# Patient Record
Sex: FEMALE | Race: WHITE | Employment: FULL TIME | URBAN - METROPOLITAN AREA
[De-identification: names, ages, dates, MRNs, and addresses within clinical notes are randomized per-mention and may not be internally consistent; named-entity substitution may affect disease eponyms.]

---

## 2019-04-03 ENCOUNTER — HOSPITAL ENCOUNTER (OUTPATIENT)
Age: 59
Discharge: HOME | End: 2019-04-05

## 2019-04-04 PROCEDURE — 88305 TISSUE EXAM BY PATHOLOGIST: CPT

## 2020-06-30 ENCOUNTER — HOSPITAL ENCOUNTER (EMERGENCY)
Age: 60
Discharge: HOME | End: 2020-06-30
Attending: EMERGENCY MEDICINE
Payer: COMMERCIAL

## 2020-06-30 VITALS
DIASTOLIC BLOOD PRESSURE: 84 MMHG | OXYGEN SATURATION: 96 % | SYSTOLIC BLOOD PRESSURE: 144 MMHG | BODY MASS INDEX: 23.62 KG/M2 | HEIGHT: 70 IN | HEART RATE: 67 BPM | TEMPERATURE: 97.5 F | RESPIRATION RATE: 16 BRPM | WEIGHT: 165 LBS

## 2020-06-30 PROCEDURE — 6370000000 HC RX 637 (ALT 250 FOR IP): Performed by: STUDENT IN AN ORGANIZED HEALTH CARE EDUCATION/TRAINING PROGRAM

## 2020-06-30 PROCEDURE — 99282 EMERGENCY DEPT VISIT SF MDM: CPT

## 2020-06-30 RX ORDER — HYDROXYZINE HYDROCHLORIDE 25 MG/1
25 TABLET, FILM COATED ORAL EVERY 8 HOURS PRN
Qty: 30 TABLET | Refills: 0 | Status: SHIPPED | OUTPATIENT
Start: 2020-06-30 | End: 2020-07-10

## 2020-06-30 RX ORDER — PREDNISONE 20 MG/1
20 TABLET ORAL 2 TIMES DAILY
Qty: 28 TABLET | Refills: 0 | Status: SHIPPED | OUTPATIENT
Start: 2020-06-30 | End: 2020-07-14

## 2020-06-30 RX ORDER — HYDROXYZINE PAMOATE 25 MG/1
25 CAPSULE ORAL ONCE
Status: COMPLETED | OUTPATIENT
Start: 2020-06-30 | End: 2020-06-30

## 2020-06-30 RX ORDER — PREDNISONE 20 MG/1
60 TABLET ORAL ONCE
Status: COMPLETED | OUTPATIENT
Start: 2020-06-30 | End: 2020-06-30

## 2020-06-30 RX ORDER — DIPHENHYDRAMINE HCL 25 MG
25 TABLET ORAL ONCE
Status: COMPLETED | OUTPATIENT
Start: 2020-06-30 | End: 2020-06-30

## 2020-06-30 RX ADMIN — HYDROXYZINE PAMOATE 25 MG: 25 CAPSULE ORAL at 08:59

## 2020-06-30 RX ADMIN — DIPHENHYDRAMINE HYDROCHLORIDE 25 MG: 25 TABLET ORAL at 08:59

## 2020-06-30 RX ADMIN — PREDNISONE 60 MG: 20 TABLET ORAL at 08:59

## 2020-06-30 ASSESSMENT — ENCOUNTER SYMPTOMS
SORE THROAT: 0
SHORTNESS OF BREATH: 0
BACK PAIN: 0
DIARRHEA: 0
WHEEZING: 0
NAUSEA: 0
ABDOMINAL DISTENTION: 0
COUGH: 0
EYE DISCHARGE: 0
VOMITING: 0
EYE PAIN: 0
EYE REDNESS: 0
SINUS PRESSURE: 0

## 2020-06-30 NOTE — ED NOTES
Pt alert and oriented x4. Speech clear. Respirations easy/unlabored. Skin warm/dry. Appropriate color. No signs of acute distress noted. Pt/family teaching provided; verbalized understanding. Pt stable for discharge.        Bina King RN  06/30/20 2823

## 2020-06-30 NOTE — ED PROVIDER NOTES
Normal range of motion and neck supple. Cardiovascular:      Rate and Rhythm: Normal rate and regular rhythm. Heart sounds: Normal heart sounds. No murmur. Pulmonary:      Effort: Pulmonary effort is normal. No respiratory distress. Breath sounds: Normal breath sounds. No wheezing or rales. Abdominal:      General: Bowel sounds are normal.      Palpations: Abdomen is soft. Tenderness: There is no abdominal tenderness. There is no guarding or rebound. Skin:     General: Skin is warm and dry. Comments: Right hernandez-orbital swelling involving the eyebrow, eyelid, and cheek. Diffuse rash noted to the forearms of both UE's. Rash to anterior chest and left side of neck. Neurological:      Mental Status: She is alert and oriented to person, place, and time. Cranial Nerves: No cranial nerve deficit. Coordination: Coordination normal.          Procedures     MDM  Number of Diagnoses or Management Options  Diagnosis management comments: Patient is a 81-year-old female showing signs of contact dermatitis to be treated with prednisone, Atarax and Benadryl and asked to follow-up with her primary physician. Patient denied any signs of respiratory distress or swelling to the throat. Patient was counseled on return precautions and was agreeable to the plan for discharge. ED Course as of Jun 30 0950 Tue Jun 30, 2020   7670 Patient to be given prednisone and atarax for continued treatment of her symptoms. She will be asked to follow up with her PCP. [AL]      ED Course User Index  [AL] Stefan Velez DO        ED Course as of Jun 30 0950 Tue Jun 30, 2020   4076 Patient to be given prednisone and atarax for continued treatment of her symptoms. She will be asked to follow up with her PCP.      [AL]      ED Course User Index  [AL] Stefan Velez DO       --------------------------------------------- PAST HISTORY ---------------------------------------------  Past Medical History:  has no with outpatient follow-up. The plan has been discussed in detail and they are aware of the specific conditions for emergent return, as well as the importance of follow-up. New Prescriptions    HYDROXYZINE (ATARAX) 25 MG TABLET    Take 1 tablet by mouth every 8 hours as needed for Itching    PREDNISONE (DELTASONE) 20 MG TABLET    Take 1 tablet by mouth 2 times daily for 14 days       Diagnosis:  1. Contact dermatitis, unspecified contact dermatitis type, unspecified trigger        Disposition:  Patient's disposition: Discharge to home  Patient's condition is stable.            Wilma Art,   Resident  06/30/20 8287

## 2020-12-23 ENCOUNTER — HOSPITAL ENCOUNTER (EMERGENCY)
Age: 60
Discharge: HOME | End: 2020-12-23
Attending: EMERGENCY MEDICINE
Payer: COMMERCIAL

## 2020-12-23 ENCOUNTER — APPOINTMENT (OUTPATIENT)
Dept: ULTRASOUND IMAGING | Age: 60
End: 2020-12-23
Payer: COMMERCIAL

## 2020-12-23 VITALS
WEIGHT: 171 LBS | TEMPERATURE: 98 F | SYSTOLIC BLOOD PRESSURE: 145 MMHG | OXYGEN SATURATION: 99 % | RESPIRATION RATE: 16 BRPM | HEART RATE: 66 BPM | BODY MASS INDEX: 24.48 KG/M2 | HEIGHT: 70 IN | DIASTOLIC BLOOD PRESSURE: 77 MMHG

## 2020-12-23 LAB
ALBUMIN SERPL-MCNC: 4.5 G/DL (ref 3.5–5.2)
ALP BLD-CCNC: 80 U/L (ref 35–104)
ALT SERPL-CCNC: 14 U/L (ref 0–32)
ANION GAP SERPL CALCULATED.3IONS-SCNC: 8 MMOL/L (ref 7–16)
AST SERPL-CCNC: 18 U/L (ref 0–31)
BASOPHILS ABSOLUTE: 0.04 E9/L (ref 0–0.2)
BASOPHILS RELATIVE PERCENT: 0.9 % (ref 0–2)
BILIRUB SERPL-MCNC: 0.4 MG/DL (ref 0–1.2)
BUN BLDV-MCNC: 10 MG/DL (ref 6–20)
CALCIUM SERPL-MCNC: 9.3 MG/DL (ref 8.6–10.2)
CHLORIDE BLD-SCNC: 106 MMOL/L (ref 98–107)
CO2: 26 MMOL/L (ref 22–29)
CREAT SERPL-MCNC: 0.8 MG/DL (ref 0.5–1)
EOSINOPHILS ABSOLUTE: 0.09 E9/L (ref 0.05–0.5)
EOSINOPHILS RELATIVE PERCENT: 1.9 % (ref 0–6)
GFR AFRICAN AMERICAN: >60
GFR NON-AFRICAN AMERICAN: >60 ML/MIN/1.73
GLUCOSE BLD-MCNC: 96 MG/DL (ref 74–99)
HCT VFR BLD CALC: 40 % (ref 34–48)
HEMOGLOBIN: 13.9 G/DL (ref 11.5–15.5)
IMMATURE GRANULOCYTES #: 0.01 E9/L
IMMATURE GRANULOCYTES %: 0.2 % (ref 0–5)
LIPASE: 57 U/L (ref 13–60)
LYMPHOCYTES ABSOLUTE: 1.55 E9/L (ref 1.5–4)
LYMPHOCYTES RELATIVE PERCENT: 33.5 % (ref 20–42)
MCH RBC QN AUTO: 29.4 PG (ref 26–35)
MCHC RBC AUTO-ENTMCNC: 34.8 % (ref 32–34.5)
MCV RBC AUTO: 84.6 FL (ref 80–99.9)
MONOCYTES ABSOLUTE: 0.42 E9/L (ref 0.1–0.95)
MONOCYTES RELATIVE PERCENT: 9.1 % (ref 2–12)
NEUTROPHILS ABSOLUTE: 2.52 E9/L (ref 1.8–7.3)
NEUTROPHILS RELATIVE PERCENT: 54.4 % (ref 43–80)
PDW BLD-RTO: 12.3 FL (ref 11.5–15)
PLATELET # BLD: 278 E9/L (ref 130–450)
PMV BLD AUTO: 9 FL (ref 7–12)
POTASSIUM SERPL-SCNC: 4 MMOL/L (ref 3.5–5)
RBC # BLD: 4.73 E12/L (ref 3.5–5.5)
SODIUM BLD-SCNC: 140 MMOL/L (ref 132–146)
TOTAL PROTEIN: 6.7 G/DL (ref 6.4–8.3)
TROPONIN: <0.01 NG/ML (ref 0–0.03)
WBC # BLD: 4.6 E9/L (ref 4.5–11.5)

## 2020-12-23 PROCEDURE — 83690 ASSAY OF LIPASE: CPT

## 2020-12-23 PROCEDURE — 99285 EMERGENCY DEPT VISIT HI MDM: CPT

## 2020-12-23 PROCEDURE — 76705 ECHO EXAM OF ABDOMEN: CPT

## 2020-12-23 PROCEDURE — 93005 ELECTROCARDIOGRAM TRACING: CPT | Performed by: EMERGENCY MEDICINE

## 2020-12-23 PROCEDURE — 84484 ASSAY OF TROPONIN QUANT: CPT

## 2020-12-23 PROCEDURE — 80053 COMPREHEN METABOLIC PANEL: CPT

## 2020-12-23 PROCEDURE — 85025 COMPLETE CBC W/AUTO DIFF WBC: CPT

## 2020-12-23 RX ORDER — HYDROCODONE BITARTRATE AND ACETAMINOPHEN 5; 325 MG/1; MG/1
1 TABLET ORAL EVERY 4 HOURS PRN
Qty: 18 TABLET | Refills: 0 | Status: SHIPPED | OUTPATIENT
Start: 2020-12-23 | End: 2020-12-26

## 2020-12-23 RX ORDER — LEVOTHYROXINE SODIUM 0.03 MG/1
100 TABLET ORAL EVERY MORNING
Status: ON HOLD | COMMUNITY
End: 2022-06-28 | Stop reason: HOSPADM

## 2020-12-23 RX ORDER — ONDANSETRON 4 MG/1
4 TABLET, ORALLY DISINTEGRATING ORAL EVERY 8 HOURS PRN
Qty: 10 TABLET | Refills: 0 | Status: SHIPPED | OUTPATIENT
Start: 2020-12-23 | End: 2022-06-27 | Stop reason: ALTCHOICE

## 2020-12-23 RX ORDER — DULOXETIN HYDROCHLORIDE 30 MG/1
30 CAPSULE, DELAYED RELEASE ORAL EVERY MORNING
COMMUNITY

## 2020-12-23 ASSESSMENT — PAIN DESCRIPTION - ORIENTATION: ORIENTATION: RIGHT;UPPER

## 2020-12-23 ASSESSMENT — PAIN DESCRIPTION - LOCATION: LOCATION: ABDOMEN

## 2020-12-23 ASSESSMENT — PAIN SCALES - GENERAL: PAINLEVEL_OUTOF10: 3

## 2020-12-23 ASSESSMENT — PAIN DESCRIPTION - PAIN TYPE: TYPE: ACUTE PAIN

## 2020-12-23 ASSESSMENT — PAIN DESCRIPTION - DESCRIPTORS: DESCRIPTORS: STABBING

## 2020-12-23 NOTE — ED NOTES
Bed: HAA  Expected date:   Expected time:   Means of arrival:   Comments:  Triage      Dhara Villa RN  12/23/20 1765

## 2020-12-23 NOTE — ED PROVIDER NOTES
Department of Emergency Medicine   ED  Provider Note  Admit Date/RoomTime: 12/23/2020  1:23 PM  ED Room: [facility]          History of Present Illness:  12/23/20, Time: 5:14 PM EST  Chief Complaint   Patient presents with    Abdominal Pain     had an ultrasound a week ago in [place] which was negative. Is suppose to have a scan done but with the holidays the office is closed. last NPO this AM at 0830. Aman Segura is a 62 y.o. female presenting to the ED for RUQ pain, beginning a few hours. The complaint has been intermittent, moderate in severity, and worsened by eating\. Patient presents for right upper quadrant pain. States she had episode about a week ago, had an ultrasound for better PCPs office was told she did not have gallstones or sludge. She is in the process of being set up for an outpatient HIDA scan. She is been eating low-fat diet, this morning had oatmeal when she had severe right upper quadrant pain. Nausea without vomiting. States this lasted for a few hours and then resolved. States pain better controlled at this time. Review of Systems:   Pertinent positives and negatives are stated within HPI, all other systems reviewed and are negative.        --------------------------------------------- PAST HISTORY ---------------------------------------------  Past Medical History:  has a past medical history of Depression and Thyroid disease. Past Surgical History:  has a past surgical history that includes eye surgery and Tonsillectomy. Social History:  reports that she has never smoked. She has never used smokeless tobacco. She reports current alcohol use. She reports that she does not use drugs. Family History: family history is not on file. . Unless otherwise noted, family history is non contributory    The patients home medications have been reviewed. Allergies: Patient has no known allergies. ---------------------------------------------------PHYSICAL EXAM--------------------------------------    Constitutional/General: Alert and oriented x3  Head: Normocephalic and atraumatic  Eyes: PERRL, EOMI, sclera non icteric  Mouth: Oropharynx clear, handling secretions, no trismus, no asymmetry of the posterior oropharynx or uvular edema  Neck: Supple, full ROM, no stridor, no meningeal signs  Respiratory: Lungs clear to auscultation bilaterally,Not in respiratory distress  Cardiovascular:  Regular rate. Regular rhythm. 2+ distal pulses. Equal extremity pulses. Chest: No chest wall tenderness  GI:  Abdomen Soft, nondistended, minimal tenderness in the right upper quadrant. No flank tenderness. No rebound, guarding, or rigidity. Musculoskeletal: Moves all extremities x 4. Warm and well perfused, no clubbing, cyanosis, or edema. Capillary refill <3 seconds  Integument: skin warm and dry. No rashes. Neurologic: GCS 15, no focal deficits, symmetric strength 5/5 in the upper and lower extremities bilaterally  Psychiatric: Normal Affect       -------------------------------------------------- RESULTS -------------------------------------------------  I have personally reviewed all laboratory and imaging results for this patient. Results are listed below.      LABS: (Lab results interpreted by me)  Results for orders placed or performed during the hospital encounter of 12/23/20   CBC auto differential   Result Value Ref Range    WBC 4.6 4.5 - 11.5 E9/L    RBC 4.73 3.50 - 5.50 E12/L    Hemoglobin 13.9 11.5 - 15.5 g/dL    Hematocrit 40.0 34.0 - 48.0 %    MCV 84.6 80.0 - 99.9 fL    MCH 29.4 26.0 - 35.0 pg    MCHC 34.8 (H) 32.0 - 34.5 %    RDW 12.3 11.5 - 15.0 fL    Platelets 715 177 - 678 E9/L    MPV 9.0 7.0 - 12.0 fL    Neutrophils % 54.4 43.0 - 80.0 %    Immature Granulocytes % 0.2 0.0 - 5.0 %    Lymphocytes % 33.5 20.0 - 42.0 %    Monocytes % 9.1 2.0 - 12.0 %    Eosinophils % 1.9 0.0 - 6.0 % Basophils % 0.9 0.0 - 2.0 %    Neutrophils Absolute 2.52 1.80 - 7.30 E9/L    Immature Granulocytes # 0.01 E9/L    Lymphocytes Absolute 1.55 1.50 - 4.00 E9/L    Monocytes Absolute 0.42 0.10 - 0.95 E9/L    Eosinophils Absolute 0.09 0.05 - 0.50 E9/L    Basophils Absolute 0.04 0.00 - 0.20 E9/L   Comprehensive metabolic panel   Result Value Ref Range    Sodium 140 132 - 146 mmol/L    Potassium 4.0 3.5 - 5.0 mmol/L    Chloride 106 98 - 107 mmol/L    CO2 26 22 - 29 mmol/L    Anion Gap 8 7 - 16 mmol/L    Glucose 96 74 - 99 mg/dL    BUN 10 6 - 20 mg/dL    CREATININE 0.8 0.5 - 1.0 mg/dL    GFR Non-African American >60 >=60 mL/min/1.73    GFR African American >60     Calcium 9.3 8.6 - 10.2 mg/dL    Total Protein 6.7 6.4 - 8.3 g/dL    Alb 4.5 3.5 - 5.2 g/dL    Total Bilirubin 0.4 0.0 - 1.2 mg/dL    Alkaline Phosphatase 80 35 - 104 U/L    ALT 14 0 - 32 U/L    AST 18 0 - 31 U/L   Lipase   Result Value Ref Range    Lipase 57 13 - 60 U/L   Troponin   Result Value Ref Range    Troponin <0.01 0.00 - 0.03 ng/mL   EKG 12 Lead   Result Value Ref Range    Ventricular Rate 57 BPM    Atrial Rate 57 BPM    P-R Interval 182 ms    QRS Duration 86 ms    Q-T Interval 430 ms    QTc Calculation (Bazett) 418 ms    P Axis 69 degrees    R Axis -21 degrees    T Axis 59 degrees   ,       RADIOLOGY:  Interpreted by Radiologist unless otherwise specified  US GALLBLADDER RUQ   Final Result   1. No cholelithiasis or evidence of gallbladder inflammation.    2.  Comet tail artifact originating within the wall of the gallbladder fundus   is suggestive of underlying adenomyomatosis.                       ------------------------- NURSING NOTES AND VITALS REVIEWED ---------------------------   The nursing notes within the ED encounter and vital signs as below have been reviewed by myself  BP (!) 145/77   Pulse 66   Temp 98 °F (36.7 °C) (Oral)   Resp 16   Ht 5' 10\" (1.778 m)   Wt 171 lb (77.6 kg)   SpO2 99%   BMI 24.54 kg/m² Oxygen Saturation Interpretation: Normal         The patients available past medical records and past encounters were reviewed. ------------------------------ ED COURSE/MEDICAL DECISION MAKING----------------------  Medications - No data to display                 Medical Decision Making:     I, Dr. Paige Antoine am the primary provider of record    Pain well controlled. Favor biliary colic. She is referred to general surgery secondary to her ultrasound. Discussed the importance of adhering to low-fat diet. She states understanding agreement. Re-Evaluations:        - Pain better controlled, pt states they feel \"better\", tolerating PO well         This patient's ED course included: a personal history and physicial examination, re-evaluation prior to disposition and complex medical decision making and emergency management    This patient has remained hemodynamically stable during their ED course. Counseling: The emergency provider has spoken with the patient and discussed todays results, in addition to providing specific details for the plan of care and counseling regarding the diagnosis and prognosis. Questions are answered at this time and they are agreeable with the plan.       --------------------------------- IMPRESSION AND DISPOSITION ---------------------------------    IMPRESSION  1. Abdominal pain, right upper quadrant    2. Biliary colic        DISPOSITION  Disposition: Discharge to home  Patient condition is stable        NOTE: This report was transcribed using voice recognition software.  Every effort was made to ensure accuracy; however, inadvertent computerized transcription errors may be present       Sabine Hampton DO  12/23/20 7165

## 2020-12-25 LAB
EKG ATRIAL RATE: 57 BPM
EKG P AXIS: 69 DEGREES
EKG P-R INTERVAL: 182 MS
EKG Q-T INTERVAL: 430 MS
EKG QRS DURATION: 86 MS
EKG QTC CALCULATION (BAZETT): 418 MS
EKG R AXIS: -21 DEGREES
EKG T AXIS: 59 DEGREES
EKG VENTRICULAR RATE: 57 BPM

## 2020-12-25 PROCEDURE — 93010 ELECTROCARDIOGRAM REPORT: CPT | Performed by: INTERNAL MEDICINE

## 2021-01-14 ENCOUNTER — HOSPITAL ENCOUNTER (OUTPATIENT)
Age: 61
Discharge: HOME | End: 2021-01-16

## 2021-01-14 PROCEDURE — 88305 TISSUE EXAM BY PATHOLOGIST: CPT

## 2021-01-14 PROCEDURE — 88342 IMHCHEM/IMCYTCHM 1ST ANTB: CPT

## 2022-05-27 ENCOUNTER — HOSPITAL ENCOUNTER (OUTPATIENT)
Age: 62
Setting detail: OBSERVATION
Discharge: HOME | End: 2022-05-28
Attending: EMERGENCY MEDICINE | Admitting: ORTHOPAEDIC SURGERY
Payer: COMMERCIAL

## 2022-05-27 DIAGNOSIS — S82.892A CLOSED FRACTURE OF LEFT ANKLE, INITIAL ENCOUNTER: Primary | ICD-10-CM

## 2022-05-27 PROCEDURE — 99285 EMERGENCY DEPT VISIT HI MDM: CPT

## 2022-05-27 ASSESSMENT — PAIN DESCRIPTION - FREQUENCY: FREQUENCY: CONTINUOUS

## 2022-05-27 ASSESSMENT — PAIN DESCRIPTION - DESCRIPTORS: DESCRIPTORS: OTHER (COMMENT)

## 2022-05-27 ASSESSMENT — LIFESTYLE VARIABLES: HOW OFTEN DO YOU HAVE A DRINK CONTAINING ALCOHOL: NEVER

## 2022-05-27 ASSESSMENT — PAIN DESCRIPTION - LOCATION: LOCATION: ANKLE

## 2022-05-27 ASSESSMENT — PAIN SCALES - GENERAL: PAINLEVEL_OUTOF10: 8

## 2022-05-27 ASSESSMENT — PAIN DESCRIPTION - ORIENTATION: ORIENTATION: LEFT

## 2022-05-27 ASSESSMENT — PAIN - FUNCTIONAL ASSESSMENT: PAIN_FUNCTIONAL_ASSESSMENT: 0-10

## 2022-05-28 ENCOUNTER — APPOINTMENT (OUTPATIENT)
Dept: GENERAL RADIOLOGY | Age: 62
End: 2022-05-28
Payer: COMMERCIAL

## 2022-05-28 ENCOUNTER — ANESTHESIA EVENT (OUTPATIENT)
Dept: OPERATING ROOM | Age: 62
End: 2022-05-28
Payer: COMMERCIAL

## 2022-05-28 ENCOUNTER — ANESTHESIA (OUTPATIENT)
Dept: OPERATING ROOM | Age: 62
End: 2022-05-28
Payer: COMMERCIAL

## 2022-05-28 VITALS
BODY MASS INDEX: 25.92 KG/M2 | HEART RATE: 67 BPM | RESPIRATION RATE: 15 BRPM | HEIGHT: 69 IN | SYSTOLIC BLOOD PRESSURE: 133 MMHG | WEIGHT: 175 LBS | DIASTOLIC BLOOD PRESSURE: 90 MMHG | TEMPERATURE: 97.3 F | OXYGEN SATURATION: 94 %

## 2022-05-28 PROBLEM — S82.822A CLOSED TORUS FRACTURE OF LOWER END OF LEFT FIBULA: Status: ACTIVE | Noted: 2022-05-28

## 2022-05-28 PROCEDURE — 6360000002 HC RX W HCPCS: Performed by: ANESTHESIOLOGY

## 2022-05-28 PROCEDURE — 7100000000 HC PACU RECOVERY - FIRST 15 MIN: Performed by: ORTHOPAEDIC SURGERY

## 2022-05-28 PROCEDURE — 2500000003 HC RX 250 WO HCPCS: Performed by: NURSE ANESTHETIST, CERTIFIED REGISTERED

## 2022-05-28 PROCEDURE — 27792 TREATMENT OF ANKLE FRACTURE: CPT | Performed by: ORTHOPAEDIC SURGERY

## 2022-05-28 PROCEDURE — 6370000000 HC RX 637 (ALT 250 FOR IP): Performed by: EMERGENCY MEDICINE

## 2022-05-28 PROCEDURE — 73610 X-RAY EXAM OF ANKLE: CPT

## 2022-05-28 PROCEDURE — C1713 ANCHOR/SCREW BN/BN,TIS/BN: HCPCS | Performed by: ORTHOPAEDIC SURGERY

## 2022-05-28 PROCEDURE — 3600000005 HC SURGERY LEVEL 5 BASE: Performed by: ORTHOPAEDIC SURGERY

## 2022-05-28 PROCEDURE — 29515 APPLICATION SHORT LEG SPLINT: CPT

## 2022-05-28 PROCEDURE — 77071 MNL APPL STRS JT RADIOGRAPHY: CPT | Performed by: ORTHOPAEDIC SURGERY

## 2022-05-28 PROCEDURE — 2709999900 HC NON-CHARGEABLE SUPPLY: Performed by: ORTHOPAEDIC SURGERY

## 2022-05-28 PROCEDURE — 2580000003 HC RX 258: Performed by: NURSE ANESTHETIST, CERTIFIED REGISTERED

## 2022-05-28 PROCEDURE — 3700000001 HC ADD 15 MINUTES (ANESTHESIA): Performed by: ORTHOPAEDIC SURGERY

## 2022-05-28 PROCEDURE — 6360000002 HC RX W HCPCS: Performed by: NURSE ANESTHETIST, CERTIFIED REGISTERED

## 2022-05-28 PROCEDURE — 73590 X-RAY EXAM OF LOWER LEG: CPT

## 2022-05-28 PROCEDURE — 3600000015 HC SURGERY LEVEL 5 ADDTL 15MIN: Performed by: ORTHOPAEDIC SURGERY

## 2022-05-28 PROCEDURE — 2720000010 HC SURG SUPPLY STERILE: Performed by: ORTHOPAEDIC SURGERY

## 2022-05-28 PROCEDURE — 7100000010 HC PHASE II RECOVERY - FIRST 15 MIN: Performed by: ORTHOPAEDIC SURGERY

## 2022-05-28 PROCEDURE — 3700000000 HC ANESTHESIA ATTENDED CARE: Performed by: ORTHOPAEDIC SURGERY

## 2022-05-28 PROCEDURE — 7100000011 HC PHASE II RECOVERY - ADDTL 15 MIN: Performed by: ORTHOPAEDIC SURGERY

## 2022-05-28 PROCEDURE — 2500000003 HC RX 250 WO HCPCS: Performed by: ORTHOPAEDIC SURGERY

## 2022-05-28 PROCEDURE — 7100000001 HC PACU RECOVERY - ADDTL 15 MIN: Performed by: ORTHOPAEDIC SURGERY

## 2022-05-28 PROCEDURE — 3209999900 FLUORO FOR SURGICAL PROCEDURES

## 2022-05-28 PROCEDURE — 99220 PR INITIAL OBSERVATION CARE/DAY 70 MINUTES: CPT | Performed by: ORTHOPAEDIC SURGERY

## 2022-05-28 DEVICE — SCREW BNE L12MM DIA4MM CANC S STL ST CANN NONLOCKING FULL: Type: IMPLANTABLE DEVICE | Site: ANKLE | Status: FUNCTIONAL

## 2022-05-28 DEVICE — SCREW BNE L16MM DIA4MM CANC S STL ST CANN NONLOCKING FULL: Type: IMPLANTABLE DEVICE | Site: ANKLE | Status: FUNCTIONAL

## 2022-05-28 DEVICE — SCREW BNE L12MM DIA3.5MM CORT S STL ST NONCANNULATED LOK: Type: IMPLANTABLE DEVICE | Site: ANKLE | Status: FUNCTIONAL

## 2022-05-28 DEVICE — SCREW BNE L14MM DIA3.5MM CORT S STL ST NONCANNULATED LOK: Type: IMPLANTABLE DEVICE | Site: ANKLE | Status: FUNCTIONAL

## 2022-05-28 DEVICE — SCREW BNE L22MM DIA2.7MM CORT S STL ST FULL THRD FOR SM: Type: IMPLANTABLE DEVICE | Site: ANKLE | Status: FUNCTIONAL

## 2022-05-28 DEVICE — SCREW BNE L14MM DIA4MM CANC S STL ST CANN NONLOCKING FULL: Type: IMPLANTABLE DEVICE | Site: ANKLE | Status: FUNCTIONAL

## 2022-05-28 DEVICE — PLATE BNE L81MM 7 H S STL 1/3 TBLR LOK COMPR W/ CLLR FOR: Type: IMPLANTABLE DEVICE | Site: ANKLE | Status: FUNCTIONAL

## 2022-05-28 RX ORDER — SODIUM CHLORIDE 0.9 % (FLUSH) 0.9 %
5-40 SYRINGE (ML) INJECTION EVERY 12 HOURS SCHEDULED
Status: DISCONTINUED | OUTPATIENT
Start: 2022-05-28 | End: 2022-05-28 | Stop reason: HOSPADM

## 2022-05-28 RX ORDER — FENTANYL CITRATE 50 UG/ML
INJECTION, SOLUTION INTRAMUSCULAR; INTRAVENOUS PRN
Status: DISCONTINUED | OUTPATIENT
Start: 2022-05-28 | End: 2022-05-28 | Stop reason: SDUPTHER

## 2022-05-28 RX ORDER — GLYCOPYRROLATE 1 MG/5 ML
SYRINGE (ML) INTRAVENOUS PRN
Status: DISCONTINUED | OUTPATIENT
Start: 2022-05-28 | End: 2022-05-28 | Stop reason: SDUPTHER

## 2022-05-28 RX ORDER — HYDROCODONE BITARTRATE AND ACETAMINOPHEN 5; 325 MG/1; MG/1
1 TABLET ORAL EVERY 6 HOURS PRN
Qty: 12 TABLET | Refills: 0 | Status: SHIPPED | OUTPATIENT
Start: 2022-05-28 | End: 2022-05-31

## 2022-05-28 RX ORDER — LIDOCAINE HYDROCHLORIDE 20 MG/ML
INJECTION, SOLUTION INTRAVENOUS PRN
Status: DISCONTINUED | OUTPATIENT
Start: 2022-05-28 | End: 2022-05-28 | Stop reason: SDUPTHER

## 2022-05-28 RX ORDER — ROCURONIUM BROMIDE 10 MG/ML
INJECTION, SOLUTION INTRAVENOUS PRN
Status: DISCONTINUED | OUTPATIENT
Start: 2022-05-28 | End: 2022-05-28 | Stop reason: SDUPTHER

## 2022-05-28 RX ORDER — CHLORHEXIDINE GLUCONATE 4 G/100ML
SOLUTION TOPICAL ONCE
Status: DISCONTINUED | OUTPATIENT
Start: 2022-05-28 | End: 2022-05-28 | Stop reason: HOSPADM

## 2022-05-28 RX ORDER — CEFAZOLIN SODIUM 1 G/3ML
INJECTION, POWDER, FOR SOLUTION INTRAMUSCULAR; INTRAVENOUS PRN
Status: DISCONTINUED | OUTPATIENT
Start: 2022-05-28 | End: 2022-05-28 | Stop reason: SDUPTHER

## 2022-05-28 RX ORDER — SODIUM CHLORIDE 9 MG/ML
INJECTION, SOLUTION INTRAVENOUS PRN
Status: DISCONTINUED | OUTPATIENT
Start: 2022-05-28 | End: 2022-05-28 | Stop reason: HOSPADM

## 2022-05-28 RX ORDER — PROPOFOL 10 MG/ML
INJECTION, EMULSION INTRAVENOUS PRN
Status: DISCONTINUED | OUTPATIENT
Start: 2022-05-28 | End: 2022-05-28 | Stop reason: SDUPTHER

## 2022-05-28 RX ORDER — SODIUM CHLORIDE 9 MG/ML
INJECTION, SOLUTION INTRAVENOUS CONTINUOUS
Status: DISCONTINUED | OUTPATIENT
Start: 2022-05-28 | End: 2022-05-28

## 2022-05-28 RX ORDER — DEXAMETHASONE SODIUM PHOSPHATE 10 MG/ML
INJECTION INTRAMUSCULAR; INTRAVENOUS PRN
Status: DISCONTINUED | OUTPATIENT
Start: 2022-05-28 | End: 2022-05-28 | Stop reason: SDUPTHER

## 2022-05-28 RX ORDER — PROPOFOL 10 MG/ML
1 INJECTION, EMULSION INTRAVENOUS ONCE
Status: DISCONTINUED | OUTPATIENT
Start: 2022-05-28 | End: 2022-05-28

## 2022-05-28 RX ORDER — NEOSTIGMINE METHYLSULFATE 1 MG/ML
INJECTION, SOLUTION INTRAVENOUS PRN
Status: DISCONTINUED | OUTPATIENT
Start: 2022-05-28 | End: 2022-05-28 | Stop reason: SDUPTHER

## 2022-05-28 RX ORDER — SODIUM CHLORIDE, SODIUM LACTATE, POTASSIUM CHLORIDE, CALCIUM CHLORIDE 600; 310; 30; 20 MG/100ML; MG/100ML; MG/100ML; MG/100ML
INJECTION, SOLUTION INTRAVENOUS CONTINUOUS PRN
Status: DISCONTINUED | OUTPATIENT
Start: 2022-05-28 | End: 2022-05-28 | Stop reason: SDUPTHER

## 2022-05-28 RX ORDER — SODIUM CHLORIDE 0.9 % (FLUSH) 0.9 %
5-40 SYRINGE (ML) INJECTION PRN
Status: DISCONTINUED | OUTPATIENT
Start: 2022-05-28 | End: 2022-05-28 | Stop reason: HOSPADM

## 2022-05-28 RX ORDER — ASPIRIN 81 MG/1
81 TABLET ORAL 2 TIMES DAILY
Qty: 56 TABLET | Refills: 0 | Status: SHIPPED | OUTPATIENT
Start: 2022-05-28 | End: 2022-08-24

## 2022-05-28 RX ORDER — OXYCODONE HYDROCHLORIDE AND ACETAMINOPHEN 5; 325 MG/1; MG/1
1 TABLET ORAL ONCE
Status: COMPLETED | OUTPATIENT
Start: 2022-05-28 | End: 2022-05-28

## 2022-05-28 RX ORDER — FENTANYL CITRATE 50 UG/ML
50 INJECTION, SOLUTION INTRAMUSCULAR; INTRAVENOUS
Status: DISCONTINUED | OUTPATIENT
Start: 2022-05-28 | End: 2022-05-28 | Stop reason: HOSPADM

## 2022-05-28 RX ORDER — ONDANSETRON 2 MG/ML
4 INJECTION INTRAMUSCULAR; INTRAVENOUS
Status: DISCONTINUED | OUTPATIENT
Start: 2022-05-28 | End: 2022-05-28 | Stop reason: HOSPADM

## 2022-05-28 RX ORDER — ONDANSETRON 2 MG/ML
INJECTION INTRAMUSCULAR; INTRAVENOUS PRN
Status: DISCONTINUED | OUTPATIENT
Start: 2022-05-28 | End: 2022-05-28 | Stop reason: SDUPTHER

## 2022-05-28 RX ADMIN — OXYCODONE AND ACETAMINOPHEN 1 TABLET: 5; 325 TABLET ORAL at 04:24

## 2022-05-28 RX ADMIN — FENTANYL CITRATE 50 MCG: 50 INJECTION, SOLUTION INTRAMUSCULAR; INTRAVENOUS at 11:29

## 2022-05-28 RX ADMIN — ONDANSETRON 4 MG: 2 INJECTION INTRAMUSCULAR; INTRAVENOUS at 12:00

## 2022-05-28 RX ADMIN — PROPOFOL 180 MG: 10 INJECTION, EMULSION INTRAVENOUS at 11:06

## 2022-05-28 RX ADMIN — LIDOCAINE HYDROCHLORIDE 70 MG: 20 INJECTION, SOLUTION INTRAVENOUS at 11:06

## 2022-05-28 RX ADMIN — FENTANYL CITRATE 50 MCG: 50 INJECTION, SOLUTION INTRAMUSCULAR; INTRAVENOUS at 11:06

## 2022-05-28 RX ADMIN — ROCURONIUM BROMIDE 40 MG: 10 SOLUTION INTRAVENOUS at 11:06

## 2022-05-28 RX ADMIN — FENTANYL CITRATE 50 MCG: 50 INJECTION, SOLUTION INTRAMUSCULAR; INTRAVENOUS at 12:23

## 2022-05-28 RX ADMIN — FENTANYL CITRATE 50 MCG: 50 INJECTION, SOLUTION INTRAMUSCULAR; INTRAVENOUS at 12:02

## 2022-05-28 RX ADMIN — SODIUM CHLORIDE, POTASSIUM CHLORIDE, SODIUM LACTATE AND CALCIUM CHLORIDE: 600; 310; 30; 20 INJECTION, SOLUTION INTRAVENOUS at 10:59

## 2022-05-28 RX ADMIN — DEXAMETHASONE SODIUM PHOSPHATE 10 MG: 10 INJECTION INTRAMUSCULAR; INTRAVENOUS at 11:06

## 2022-05-28 RX ADMIN — Medication 0.6 MG: at 12:01

## 2022-05-28 RX ADMIN — HYDROMORPHONE HYDROCHLORIDE 0.5 MG: 1 INJECTION, SOLUTION INTRAMUSCULAR; INTRAVENOUS; SUBCUTANEOUS at 13:25

## 2022-05-28 RX ADMIN — FENTANYL CITRATE 50 MCG: 50 INJECTION, SOLUTION INTRAMUSCULAR; INTRAVENOUS at 10:59

## 2022-05-28 RX ADMIN — Medication 3 MG: at 12:01

## 2022-05-28 RX ADMIN — SODIUM CHLORIDE, POTASSIUM CHLORIDE, SODIUM LACTATE AND CALCIUM CHLORIDE: 600; 310; 30; 20 INJECTION, SOLUTION INTRAVENOUS at 11:49

## 2022-05-28 RX ADMIN — CEFAZOLIN 2000 MG: 1 INJECTION, POWDER, FOR SOLUTION INTRAMUSCULAR; INTRAVENOUS at 11:14

## 2022-05-28 ASSESSMENT — PAIN SCALES - GENERAL
PAINLEVEL_OUTOF10: 10
PAINLEVEL_OUTOF10: 8

## 2022-05-28 ASSESSMENT — PAIN DESCRIPTION - ORIENTATION: ORIENTATION: LEFT

## 2022-05-28 ASSESSMENT — PAIN DESCRIPTION - DESCRIPTORS: DESCRIPTORS: DULL;ACHING

## 2022-05-28 ASSESSMENT — PAIN DESCRIPTION - LOCATION
LOCATION: FOOT
LOCATION: ANKLE

## 2022-05-28 NOTE — ANESTHESIA POSTPROCEDURE EVALUATION
Department of Anesthesiology  Postprocedure Note    Patient: Morteza Keane  MRN: [id number]  YOB: 1960  Date of evaluation: 5/28/2022  Time:  3:04 PM     Procedure Summary     Date: 05/28/22 Room / Location: [facility] OR 08 / [facility]    Anesthesia Start: 1059 Anesthesia Stop: 8445    Procedure: ANKLE OPEN REDUCTION INTERNAL FIXATION (Left Leg Lower) Diagnosis:       Closed bimalleolar fracture of left ankle, initial encounter      (CLOSED BIMALLEOLAR ANKLE FRACTURE)    Surgeons: Carlos Mckay MD Responsible Provider: Antonio Mohamud DO    Anesthesia Type: general ASA Status: 3 - Emergent          Anesthesia Type: No value filed. Jose Phase I: Jose Score: 10    Jose Phase II: Jose Score: 10    Last vitals: Reviewed and per EMR flowsheets.        Anesthesia Post Evaluation    Patient location during evaluation: PACU  Patient participation: complete - patient participated  Level of consciousness: awake and alert  Pain score: 1  Airway patency: patent  Nausea & Vomiting: no nausea and no vomiting  Complications: no  Cardiovascular status: hemodynamically stable  Respiratory status: acceptable  Hydration status: euvolemic

## 2022-05-28 NOTE — ANESTHESIA PRE PROCEDURE
Department of Anesthesiology  Preprocedure Note       Name:  Aman Segura   Age:  62 y.o.  :  1960                                          MRN:  [id number]         Date:  2022      Surgeon: Brock Kelly):  Curt Severs, MD    Procedure: Procedure(s):  ANKLE OPEN REDUCTION INTERNAL FIXATION    Medications prior to admission:   Prior to Admission medications    Medication Sig Start Date End Date Taking? Authorizing Provider   HYDROcodone-acetaminophen (NORCO) 5-325 MG per tablet Take 1 tablet by mouth every 6 hours as needed for Pain for up to 3 days. Intended supply: 3 days. Take lowest dose possible to manage pain 22 Yes SERGIO De La Torre   DULoxetine (CYMBALTA) 30 MG extended release capsule Take 30 mg by mouth daily    Historical Provider, MD   levothyroxine (SYNTHROID) 100 MCG tablet Take 100 mcg by mouth Daily    Historical Provider, MD   ondansetron (ZOFRAN ODT) 4 MG disintegrating tablet Take 1 tablet by mouth every 8 hours as needed for Nausea or Vomiting May substitute for covered product 20   Christiana Garnica, DO       Current medications:    Current Facility-Administered Medications   Medication Dose Route Frequency Provider Last Rate Last Admin    fentaNYL (SUBLIMAZE) injection 50 mcg  50 mcg IntraVENous Once Gato Macedo, DO        chlorhexidine (HIBICLENS) 4 % liquid   Topical Once Gato Macedo, DO        fentaNYL (SUBLIMAZE) injection 50 mcg  50 mcg IntraVENous Q1H PRN Christiana Garnica, DO         Current Outpatient Medications   Medication Sig Dispense Refill    HYDROcodone-acetaminophen (NORCO) 5-325 MG per tablet Take 1 tablet by mouth every 6 hours as needed for Pain for up to 3 days. Intended supply: 3 days.  Take lowest dose possible to manage pain 12 tablet 0    DULoxetine (CYMBALTA) 30 MG extended release capsule Take 30 mg by mouth daily      levothyroxine (SYNTHROID) 100 MCG tablet Take 100 mcg by mouth Daily      ondansetron (ZOFRAN ODT) 4 MG disintegrating tablet Take 1 tablet by mouth every 8 hours as needed for Nausea or Vomiting May substitute for covered product 10 tablet 0       Allergies:  No Known Allergies    Problem List:  There is no problem list on file for this patient. Past Medical History:        Diagnosis Date    Depression     Thyroid disease        Past Surgical History:        Procedure Laterality Date    EYE SURGERY      TONSILLECTOMY         Social History:    Social History     Tobacco Use    Smoking status: Never Smoker    Smokeless tobacco: Never Used   Substance Use Topics    Alcohol use: Yes     Comment: socially. Counseling given: Not Answered      Vital Signs (Current):   Vitals:    05/27/22 2351   BP: (!) 158/103   Pulse: 76   Resp: 17   Temp: 98.6 °F (37 °C)   TempSrc: Oral   SpO2: 96%   Weight: 175 lb (79.4 kg)   Height: 5' 9\" (1.753 m)                                              BP Readings from Last 3 Encounters:   05/27/22 (!) 158/103   12/23/20 (!) 145/77   06/30/20 (!) 144/84       NPO Status:                                                                                 BMI:   Wt Readings from Last 3 Encounters:   05/27/22 175 lb (79.4 kg)   12/23/20 171 lb (77.6 kg)   06/30/20 165 lb (74.8 kg)     Body mass index is 25.84 kg/m².     CBC:   Lab Results   Component Value Date    WBC 4.6 12/23/2020    RBC 4.73 12/23/2020    HGB 13.9 12/23/2020    HCT 40.0 12/23/2020    MCV 84.6 12/23/2020    RDW 12.3 12/23/2020     12/23/2020       CMP:   Lab Results   Component Value Date     12/23/2020    K 4.0 12/23/2020     12/23/2020    CO2 26 12/23/2020    BUN 10 12/23/2020    CREATININE 0.8 12/23/2020    GFRAA >60 12/23/2020    LABGLOM >60 12/23/2020    GLUCOSE 96 12/23/2020    GLUCOSE 113 10/30/2010    PROT 6.7 12/23/2020    CALCIUM 9.3 12/23/2020    BILITOT 0.4 12/23/2020    ALKPHOS 80 12/23/2020    AST 18 12/23/2020    ALT 14 12/23/2020       POC Tests: No results for input(s): POCGLU, POCNA, POCK, POCCL, POCBUN, POCHEMO, POCHCT in the last 72 hours. Coags: No results found for: PROTIME, INR, APTT    HCG (If Applicable): No results found for: PREGTESTUR, PREGSERUM, HCG, HCGQUANT     ABGs: No results found for: PHART, PO2ART, HCS7MZZ, IJN4MAN, BEART, J1HVUAOG     Type & Screen (If Applicable):  No results found for: LABABO, LABRH    Drug/Infectious Status (If Applicable):  No results found for: HIV, HEPCAB    COVID-19 Screening (If Applicable): No results found for: COVID19        Anesthesia Evaluation  Patient summary reviewed and Nursing notes reviewed no history of anesthetic complications:   Airway: Mallampati: II  TM distance: >3 FB   Neck ROM: full  Mouth opening: > = 3 FB   Dental:      Comment: Upper front crowns    Pulmonary:Negative Pulmonary ROS breath sounds clear to auscultation                             Cardiovascular:Negative CV ROS            Rhythm: regular  Rate: normal                    Neuro/Psych:   (+) depression/anxiety             GI/Hepatic/Renal:             Endo/Other:    (+) hypothyroidism::., .                 Abdominal:             Vascular: negative vascular ROS. Other Findings:           Anesthesia Plan      general     ASA 3 - emergent             Anesthetic plan and risks discussed with patient and child/children. Rene Riojas DO   5/28/2022        Patient seen and examined, chart reviewed, agree with above findings. Anesthetic plan, risks, benefits, alternatives, and personnel involved discussed with patient and her daughter. Patient verbalized an understanding and agreed to proceed. NPO status confirmed. Anesthetic plan discussed with care team members and agreed upon.     Rene Riojas DO   5/28/2022  9:12 AM

## 2022-05-28 NOTE — PROGRESS NOTES
Seen and examined. She is currently in a chair in the ED, waiting for an available room so that we can do the reduction of her left ankle. On exam, she does have moderate edema about the left ankle and her pain is starting to improve states that she recently took an oxycodone which is helping. Able to wiggle toes, good capillary refill. Patient states that initially she was mistakenly given a postop shoe and crutches and discharged from the ED, however she was called back and orthopedics was consulted. Full consult note to follow, awaiting room so we can do conscious sedation and reduction with portable x-ray.

## 2022-05-28 NOTE — OP NOTE
Operative Note      Patient: Doc Sadler  YOB: 1960  MRN: [id number]    Date of Procedure: 5/28/2022    Pre-Op Diagnosis: Closed left Ríos B fibular fracture with widening of the medial clear space    Post-Op Diagnosis: Same         Procedure:  1. Open reduction internal fixation left lateral malleolus fracture    2. Fluoroscopic stress view in the operating room of left ankle syndesmosis        Surgeon(s):  Janey Stubbs MD    Assistant:   Resident: Luanne Mohamud DO; Vanessa Dougherty DO; Ruslan Pearl DO    Anesthesia: General    Estimated Blood Loss (mL): Minimal    Complications: None    Specimens:   * No specimens in log *    Implants:  Implant Name Type Inv. Item Serial No.  Lot No. LRB No. Used Action   PLATE BNE [id number] 7 H S STL 1/3 TBLR LUCHO COMPR W/ CLLR FOR - [id number]  PLATE BNE [id number] 7 H S STL 1/3 TBLR LUCHO COMPR W/ CLLR FOR  [employer] [place]-  Left 1 Implanted   SCREW BNE L22MM DIA2.7MM AILEEN S STL ST FULL THRD FOR SM - [id number]  SCREW BNE L22MM DIA2.7MM AILEEN S STL ST FULL THRD FOR SM  [employer] [place]-  Left 1 Implanted   SCREW BNE L12MM DIA3.5MM AILEEN S STL ST NONCANNULATED LUCHO - [id number]  SCREW BNE L12MM DIA3.5MM AILEEN S STL ST NONCANNULATED LUCHO  DEPU[employer] [place]-  Left 2 Implanted   SCREW BNE L14MM DIA3.5MM AILEEN S STL ST NONCANNULATED LUCHO - [id number]  SCREW BNE L14MM DIA3.5MM AILEEN S STL ST NONCANNULATED LUCHO  DEPUY [employer] [place]-WD  Left 1 Implanted   SCREW BNE L12MM DIA4MM CANC S STL ST KAYLIE NONLOCKING FULL - [id number]  SCREW BNE L12MM DIA4MM CANC S STL ST KAYLIE NONLOCKING FULL  DEPU[employer]-  Left 1 Implanted         Drains: * No LDAs found *    Findings: Used a 7 hole one third tubular plate contoured to the fibula and a 2.7 millimeter screw from anterior to posterior    Detailed Description of Procedure:   Patient was brought to the operating room in a supine position on a hospital bed.   Patient was transferred to the operating room table by multiple individuals in a safe fashion with anesthesia in control of the patient's C-spine and airway. Once on the operating table, all points of pressure were identified and well-padded. A tourniquet was applied to the patient's left upper thigh. The patient's left lower extremity was sterilely prepped and draped in the standard orthopedic fashion. A timeout was performed indicating the appropriate identification of the patient, the procedure to be performed, and the side to be performed upon. This was agreed upon by all individuals in the room. This point time a subcutaneous approach to the left fibula was marked out. Esmarch applied. Tourniquet was elevated to 250 mmHg. 10 blade used to make an incision. Careful dissection was carried out down to the bone leaving good thick skin flaps. Superficial peroneal nerve was identified and protected. Subperiosteal dissection was carried out the fracture site. The hematoma was then cleaned out. Point reduction clamps were then utilized to reduce the fracture anatomically. A 2.7 mm lag screw was placed by technique from anterior to posterior. At this point time we precontoured a 7 hole one third tubular plate and placed multiple bicortical screws proximally and cancellous screws distally under fluoroscopic guidance. After views confirmed appropriate placement of hardware a mortise view was obtained and external rotation stress was applied showing a stable syndesmosis. At this point time, the wound was jemima irrigated with sterile saline. The subtenons tissue was closed with 2-0 Monocryl and the skin with 3-0 nylon's. Bacitracin Xeroform and a well-padded 3 sided splint were put in position. Patient was versed in anesthesia without complication and taken to the PACU in stable condition. Postoperative plan:  1. Strict nonweightbearing left lower extremity    2. Aspirin for DVT prophylaxis    3.   Follow-up in office in 2 weeks for suture removal x-rays and potential transition into a boot if soft tissue in good condition      Electronically signed by Carl Miller MD on 5/28/2022 at 11:55 AM

## 2022-05-28 NOTE — PROGRESS NOTES
Consult received, bimalleolar ankle fracture. Spoke with ED attending, recommended transferring patient to a room for conscious sedation and reduction. Full consult note to follow.

## 2022-05-28 NOTE — H&P
Department of Orthopedic Surgery  Resident Consult Note          Reason for Consult: Left ankle fracture    HISTORY OF PRESENT ILLNESS:       Patient is a 62 y.o. female who presents with left ankle pain and swelling that started last night. Patient states that her dog was excited and running around, ran into her from behind and clipped her left leg causing her to twist and fall to the ground. She noticed immediate left ankle pain and inability to ambulate. States she also heard a loud crack and knew that she broke her ankle immediately. Is a community ambulator without assist.  Denies numbness/tingling/paresthesias. Denies any other orthopedic complaints at this time. Denies any significant medical comorbidities    Past Medical History:        Diagnosis Date    Depression     Thyroid disease      Past Surgical History:        Procedure Laterality Date    EYE SURGERY      TONSILLECTOMY       Current Medications:   Current Facility-Administered Medications: fentaNYL (SUBLIMAZE) injection 50 mcg, 50 mcg, IntraVENous, Once  chlorhexidine (HIBICLENS) 4 % liquid, , Topical, Once  fentaNYL (SUBLIMAZE) injection 50 mcg, 50 mcg, IntraVENous, Q1H PRN  Facility-Administered Medications Ordered in Other Encounters: ceFAZolin (ANCEF) injection, , IntraVENous, PRN  dexamethasone (DECADRON) injection, , IntraVENous, PRN  lidocaine (cardiac) (XYLOCAINE) injection, , IntraVENous, PRN  propofol injection, , IntraVENous, PRN  fentaNYL (SUBLIMAZE) injection, , IntraVENous, PRN  rocuronium (ZEMURON) injection, , IntraVENous, PRN  lactated ringers infusion, , IntraVENous, Continuous PRN  Allergies:  Patient has no known allergies. Social History:   TOBACCO:   reports that she has never smoked. She has never used smokeless tobacco.  ETOH:   reports current alcohol use. DRUGS:   reports no history of drug use. ACTIVITIES OF DAILY LIVING:    OCCUPATION:    Family History:   History reviewed.  No pertinent family history. REVIEW OF SYSTEMS:  CONSTITUTIONAL:  negative for  fevers, chills  EYES:  negative for blurred vision, visual disturbance  HEENT:  negative for  hearing loss, voice change  RESPIRATORY:  negative for  dyspnea, wheezing  CARDIOVASCULAR:  negative for  chest pain, palpitations  GASTROINTESTINAL:  negative for nausea, vomiting  GENITOURINARY:  negative for frequency, urinary incontinence  HEMATOLOGIC/LYMPHATIC:  negative for bleeding and petechiae  MUSCULOSKELETAL:  positive for left ankle pain  NEUROLOGICAL:  negative for headaches, dizziness  BEHAVIOR/PSYCH:  negative for increased agitation and anxiety    PHYSICAL EXAM:    VITALS:  BP (!) 158/103   Pulse 76   Temp 98.6 °F (37 °C) (Oral)   Resp 17   Ht 5' 9\" (1.753 m)   Wt 175 lb (79.4 kg)   SpO2 96%   BMI 25.84 kg/m²   CONSTITUTIONAL:  awake, alert, cooperative, no apparent distress, and appears stated age  MUSCULOSKELETAL:  Left lower Extremity:  · Skin is intact circumferentially   · Moderate edema over the lateral malleolus  · TTP about the lateral and medial malleoli  · Patient is able to wiggle toes, motor exam grossly unremarkable but limited by pain  · Sensation to light touch is grossly intact in the peroneal's, sural, saphenous, tibial distribution  · Toes are warm and perfused with good capillary refill    Secondary Exam:   · No obvious signs of trauma on secondary exam    DATA:    CBC:   Lab Results   Component Value Date    WBC 4.6 12/23/2020    RBC 4.73 12/23/2020    HGB 13.9 12/23/2020    HCT 40.0 12/23/2020    MCV 84.6 12/23/2020    MCH 29.4 12/23/2020    MCHC 34.8 12/23/2020    RDW 12.3 12/23/2020     12/23/2020    MPV 9.0 12/23/2020     PT/INR:  No results found for: PROTIME, INR    Radiology Review:  3 views of the left ankle reviewed. Bimalleolar equivalent ankle fracture. There is a short oblique Ríos B type fracture of the lateral malleolus with significant widening of the medial clear space.     IMPRESSION:  · Closed left bimalleolar equivalent fracture    PLAN:  · Patient underwent conscious sedation and closed reduction with splinting in the ED. There are informed consent was obtained, the patient underwent closed reduction followed by placement of a well-padded 3 sided short leg splint. Patient tolerated well  · Pain medications per ED  · I discussed the plan for operative treatment of the patient's fracture, she will follow-up in trauma clinic this week for skin assessment, and when her swelling improves we will plan on scheduling her for ORIF of the left ankle  · I have explained the risks and complications of the recommended surgery with the patient at length, as well as discussed potential treatment alternatives including nonoperative management. These risks include but are not limited to death or complication from anesthesia, continued pain, nerve tendon or vascular injury, infection, hematoma, deep vein thrombosis or pulmonary embolism, and need for further surgery, etc. Patient understood this, asked appropriate questions, which were all answered. She agrees with the plan  · Nonweightbearing left lower extremity  · Discuss with attending    Majo Perez MD , PGY-1  5/28/22      I have seen and evaluated the patient and agree with the above assessment on today's visit. I have performed the key components of the history and physical examination and concur completely with the findings and plans as documented. Agree with ROS, examination, FMH, PMH, PSH, SocHx, and allergies as above. Patient is a very pleasant 70-year-old female who is status post fall last evening when she got knocked down by her dog. She is on have a left B fibular fracture with significant widening of her syndesmosis. She is otherwise healthy. The decision was made to take her to the operating room today for open reduction internal fixation of her left fibular fracture with stressing of her syndesmosis.   This was a possibility due to the fact she did not have any significant swelling. I did explain the surgery in detail. I explained the risks and complications of surgery with the patient including but not limited to death from anesthesia, possible neurovascular damage, possible infection, possible nonunion, possible hardware failure, possible need for further surgery, etc.  Patient understood this, asked appropriate questions and decided to go forward with the procedure. Past Medical History:   Diagnosis Date    Depression     Thyroid disease      Past Surgical History:   Procedure Laterality Date    EYE SURGERY      TONSILLECTOMY       History reviewed. No pertinent family history. Social History     Tobacco Use    Smoking status: Never Smoker    Smokeless tobacco: Never Used   Vaping Use    Vaping Use: Never used   Substance Use Topics    Alcohol use: Yes     Comment: socially.  Drug use: Never     No Known Allergies        Physical Examination:   General appearance: alert, well appearing, and in no distress,  normal appearing weight. No visible signs of trauma   Mental status: alert, oriented to person, place, and time, normal mood, behavior, speech, dress, motor activity, and thought processes  Abdomen: soft, nondistended  Resp:   resp easy and unlabored, no audible wheezes note, normal symmetrical expansion of both hemithoraces  Cardiac: distal pulses palpable, skin and extremities well perfused  Neurological: alert, oriented X3, normal speech, no focal findings or movement disorder noted, motor and sensory grossly normal bilaterally, normal muscle tone, no tremors  HEENT: normochephalic atraumatic, external ears and eyes normal, sclera normal, neck supple, no nasal discharge.    Extremities:   peripheral pulses normal, no edema, redness or tenderness in the calves   Skin: normal coloration, no rashes or open wounds, no suspicious skin lesions noted  Psych: Affect euthymic   Musculoskeletal:   Extremity:  Left lower extremity  Agree with above examination. Skin is intact, there is mild ecchymosis and mild swelling, compartment soft compressible. Patient wiggles toes. She does have tenderness palpation medially and obviously laterally over her fibula. Otherwise she is neurovascular intact distally. Plan open reduction internal fixation with stress of the syndesmosis and open reduction internal fixation of the syndesmosis if necessary        ELECTRONICALLY signed by:    Rico Hardy MD  5/28/22   This is been dictated utilizing voice recognition software. All efforts have been made to make the note accurate although inadvertent errors may be present.

## 2022-05-28 NOTE — ED PROVIDER NOTES
ED Attending shared visit  CC: No  HPI:  5/27/22, Time: 11:50 PM EDT         Twan Carrera is a 62 y.o. female presenting to the ED for left ankle, beginning prior to arrival.  The complaint has been persistent, moderate in severity, and worsened by movement of Ankle . Patient comes in with complaint of left ankle injury. She was tripped by one of the dogs at home. She denies any head injury no loss of consciousness no neck pain. Patient is not on any blood thinners. Review of Systems:   A complete review of systems was performed and pertinent positives and negatives are stated within HPI, all other systems reviewed and are negative.          --------------------------------------------- PAST HISTORY ---------------------------------------------  Past Medical History:  has a past medical history of Depression and Thyroid disease. Past Surgical History:  has a past surgical history that includes eye surgery and Tonsillectomy. Social History:  reports that she has never smoked. She has never used smokeless tobacco. She reports current alcohol use. She reports that she does not use drugs. Family History: family history is not on file. The patients home medications have been reviewed. Allergies: Patient has no known allergies. -------------------------------------------------- RESULTS -------------------------------------------------  All laboratory and radiology results have been personally reviewed by myself   LABS:  No results found for this visit on 05/27/22. RADIOLOGY:  Interpreted by Radiologist.  XR ANKLE LEFT (MIN 3 VIEWS)   Final Result   Expected postop changes of left malleolar ORIF. Alignment is near anatomic. XR TIBIA FIBULA LEFT (2 VIEWS)   Final Result   No acute findings involving the proximal tibia or fibula. Please refer to   the ankle radiograph report for findings regarding the distal fibula and   tibia.          XR ANKLE LEFT (MIN 3 VIEWS)   Final Result orthopedic resident for reduction of ankle    0400 care  Transferred to Dr. Casi Jones Making:    Orthopedic surgery decided to admit patient for operative management in the OR. Admitted to orthopedic surgery service in stable condition. Counseling: The emergency provider has spoken with the patient and discussed todays results, in addition to providing specific details for the plan of care and counseling regarding the diagnosis and prognosis. Questions are answered at this time and they are agreeable with the plan.      --------------------------------- IMPRESSION AND DISPOSITION ---------------------------------    IMPRESSION  1. Closed fracture of left ankle, initial encounter        DISPOSITION  Disposition: Admit to OR  Patient condition is good      NOTE: This report was transcribed using voice recognition software.  Every effort was made to ensure accuracy; however, inadvertent computerized transcription errors may be present     Kinza Wilkinson  05/28/22 21 [facility] Yesica Gonsales MD  05/30/22 [id number]

## 2022-05-28 NOTE — CONSULTS
Department of Orthopedic Surgery  Resident Consult Note          Reason for Consult: Left ankle fracture    HISTORY OF PRESENT ILLNESS:       Patient is a 62 y.o. female who presents with left ankle pain and swelling that started last night. Patient states that her dog was excited and running around, ran into her from behind and clipped her left leg causing her to twist and fall to the ground. She noticed immediate left ankle pain and inability to ambulate. States she also heard a loud crack and knew that she broke her ankle immediately. Is a community ambulator without assist.  Denies numbness/tingling/paresthesias. Denies any other orthopedic complaints at this time. Denies any significant medical comorbidities    Past Medical History:        Diagnosis Date    Depression     Thyroid disease      Past Surgical History:        Procedure Laterality Date    EYE SURGERY      TONSILLECTOMY       Current Medications:   Current Facility-Administered Medications: fentaNYL (SUBLIMAZE) injection 50 mcg, 50 mcg, IntraVENous, Once  Allergies:  Patient has no known allergies. Social History:   TOBACCO:   reports that she has never smoked. She has never used smokeless tobacco.  ETOH:   reports current alcohol use. DRUGS:   reports no history of drug use. ACTIVITIES OF DAILY LIVING:    OCCUPATION:    Family History:   History reviewed. No pertinent family history.     REVIEW OF SYSTEMS:  CONSTITUTIONAL:  negative for  fevers, chills  EYES:  negative for blurred vision, visual disturbance  HEENT:  negative for  hearing loss, voice change  RESPIRATORY:  negative for  dyspnea, wheezing  CARDIOVASCULAR:  negative for  chest pain, palpitations  GASTROINTESTINAL:  negative for nausea, vomiting  GENITOURINARY:  negative for frequency, urinary incontinence  HEMATOLOGIC/LYMPHATIC:  negative for bleeding and petechiae  MUSCULOSKELETAL:  positive for left ankle pain  NEUROLOGICAL:  negative for headaches, dizziness  BEHAVIOR/PSYCH:  negative for increased agitation and anxiety    PHYSICAL EXAM:    VITALS:  BP (!) 158/103   Pulse 76   Temp 98.6 °F (37 °C) (Oral)   Resp 17   Ht 5' 9\" (1.753 m)   Wt 175 lb (79.4 kg)   SpO2 96%   BMI 25.84 kg/m²   CONSTITUTIONAL:  awake, alert, cooperative, no apparent distress, and appears stated age  MUSCULOSKELETAL:  Left lower Extremity:  Skin is intact circumferentially   Moderate edema over the lateral malleolus  TTP about the lateral and medial malleoli  Patient is able to wiggle toes, motor exam grossly unremarkable but limited by pain  Sensation to light touch is grossly intact in the peroneal's, sural, saphenous, tibial distribution  Toes are warm and perfused with good capillary refill    Secondary Exam:   No obvious signs of trauma on secondary exam    DATA:    CBC:   Lab Results   Component Value Date    WBC 4.6 12/23/2020    RBC 4.73 12/23/2020    HGB 13.9 12/23/2020    HCT 40.0 12/23/2020    MCV 84.6 12/23/2020    MCH 29.4 12/23/2020    MCHC 34.8 12/23/2020    RDW 12.3 12/23/2020     12/23/2020    MPV 9.0 12/23/2020     PT/INR:  No results found for: PROTIME, INR    Radiology Review:  3 views of the left ankle reviewed. Bimalleolar equivalent ankle fracture. There is a short oblique Ríos B type fracture of the lateral malleolus with significant widening of the medial clear space. IMPRESSION:  Closed left bimalleolar equivalent fracture    PLAN:  Patient underwent conscious sedation and closed reduction with splinting in the ED. There are informed consent was obtained, the patient underwent closed reduction followed by placement of a well-padded 3 sided short leg splint.   Patient tolerated well  Pain medications per ED  I discussed the plan for operative treatment of the patient's fracture, and we will plan on doing that today   I have explained the risks and complications of the recommended surgery with the patient at length, as well as discussed potential treatment alternatives including nonoperative management. These risks include but are not limited to death or complication from anesthesia, continued pain, nerve tendon or vascular injury, infection, hematoma, deep vein thrombosis or pulmonary embolism, and need for further surgery, etc. Patient understood this, asked appropriate questions, which were all answered.   She agrees with the plan  Nonweightbearing left lower extremity  Discuss with attending    Ravi Medina DO , PGY-1  5/28/22

## 2022-05-31 ENCOUNTER — TELEPHONE (OUTPATIENT)
Dept: ADMINISTRATIVE | Age: 62
End: 2022-05-31

## 2022-05-31 DIAGNOSIS — S82.892A CLOSED FRACTURE OF LEFT ANKLE, INITIAL ENCOUNTER: Primary | ICD-10-CM

## 2022-05-31 NOTE — TELEPHONE ENCOUNTER
Please advise scheduling PO appointment from 5/28 surgery. Patient also inquiring about knee scooter as she is currently non weight bearing.

## 2022-06-01 DIAGNOSIS — S82.892A CLOSED FRACTURE OF LEFT ANKLE, INITIAL ENCOUNTER: ICD-10-CM

## 2022-06-01 RX ORDER — HYDROCODONE BITARTRATE AND ACETAMINOPHEN 5; 325 MG/1; MG/1
1 TABLET ORAL EVERY 6 HOURS PRN
Qty: 28 TABLET | Refills: 0 | Status: SHIPPED | OUTPATIENT
Start: 2022-06-01 | End: 2022-06-01 | Stop reason: CLARIF

## 2022-06-01 RX ORDER — HYDROCODONE BITARTRATE AND ACETAMINOPHEN 5; 325 MG/1; MG/1
1 TABLET ORAL EVERY 6 HOURS PRN
Qty: 28 TABLET | Refills: 0 | Status: SHIPPED | OUTPATIENT
Start: 2022-06-01 | End: 2022-06-08

## 2022-06-01 NOTE — TELEPHONE ENCOUNTER
Refill placed in orders only by Jovany Zavala PA-C. Unable to complete in this telephone encounter as it was started by preservice.

## 2022-06-01 NOTE — PROGRESS NOTES
Date of Procedure: 5/28/2022     Pre-Op Diagnosis: Closed left Ríos B fibular fracture with widening of the medial clear space     Post-Op Diagnosis: Same          Procedure:  1. Open reduction internal fixation left lateral malleolus fracture     2. Fluoroscopic stress view in the operating room of left ankle syndesmosis           Surgeon(s):  MD Mickey Ma refilled.

## 2022-06-01 NOTE — TELEPHONE ENCOUNTER
Call to pt scheduled appt   Future Appointments   Date Time Provider Alba Ruff   6/6/2022  9:45 AM SCHEDULE, [facility]     She is going to check w/her insurance to see who is in her network and will call us back. She was only given 3 days of pain medication. Routing to clinical staff to see if additional pain meds can be prescribed.

## 2022-06-06 ENCOUNTER — TELEPHONE (OUTPATIENT)
Dept: ORTHOPEDIC SURGERY | Age: 62
End: 2022-06-06

## 2022-06-06 DIAGNOSIS — S82.892A CLOSED FRACTURE OF LEFT ANKLE, INITIAL ENCOUNTER: Primary | ICD-10-CM

## 2022-06-06 NOTE — TELEPHONE ENCOUNTER
Patient called in requesting a prescription for a Knee scooter be placed and sent faxed at this time to [phone]. Date of Procedure: 5/28/2022  Procedure:  1. Open reduction internal fixation left lateral malleolus fracture     2. Fluoroscopic stress view in the operating room of left ankle syndesmosis  Surgeon(s):  Mariel Gentile MD    Routed to Providers for consideration and guidance.       Future Appointments   Date Time Provider Alba Ruff   6/13/2022 10:30 AM SCHEDULE, [facility]

## 2022-06-07 DIAGNOSIS — S82.892A CLOSED FRACTURE OF LEFT ANKLE, INITIAL ENCOUNTER: Primary | ICD-10-CM

## 2022-06-13 ENCOUNTER — HOSPITAL ENCOUNTER (OUTPATIENT)
Dept: GENERAL RADIOLOGY | Age: 62
Discharge: HOME | End: 2022-06-15
Payer: COMMERCIAL

## 2022-06-13 ENCOUNTER — OFFICE VISIT (OUTPATIENT)
Dept: ORTHOPEDIC SURGERY | Age: 62
End: 2022-06-13
Payer: COMMERCIAL

## 2022-06-13 DIAGNOSIS — S82.892A CLOSED FRACTURE OF LEFT ANKLE, INITIAL ENCOUNTER: Primary | ICD-10-CM

## 2022-06-13 DIAGNOSIS — S82.892A CLOSED FRACTURE OF LEFT ANKLE, INITIAL ENCOUNTER: ICD-10-CM

## 2022-06-13 PROCEDURE — 73610 X-RAY EXAM OF ANKLE: CPT

## 2022-06-13 PROCEDURE — 99024 POSTOP FOLLOW-UP VISIT: CPT | Performed by: PHYSICIAN ASSISTANT

## 2022-06-13 PROCEDURE — 99213 OFFICE O/P EST LOW 20 MIN: CPT

## 2022-06-13 NOTE — PROGRESS NOTES
Chief Complaint   Patient presents with    Follow Up After Procedure     2 wk post op ORIF LT lateral malleolus fracture, C/O aching pain in incisions, ambulating with scooter, splint intact removed for visit. OP:SURGEON: Dr. Marilyn Boswell MD  DATE OF PROCEDURE: 5-28-22  PROCEDURE: 1. Open reduction internal fixation left lateral malleolus fracture     2. Fluoroscopic stress view in the operating room of left ankle syndesmosis     POD: 2 weeks    Subjective:  Rogerio Muse is following up from the above surgery. She is NWB on left lower extremity. She ambulates with assistive device, knee scooter. Pain to extremity is none and is not taking prescribed pain medication. They denies numbness or tingling to the left lower extremity. Denies calf pain, chest pain, or shortness of breath. Patient continues to use DVT prophylaxis, ASA 81 mg BID. Patient is not participating in therapy at this time. She is doing well after surgery. She states the pain and swelling has gone down in her ankle. Review of Systems -  All pertinent positives/negative in HPI     Objective:    General: Alert and oriented X 3, normocephalic atraumatic, external ears and eye normal, sclera clear, no acute distress, respirations easy and unlabored with no audible wheezes, skin warm and dry, speech and dress appropriate for noted age, affect euthymic. Extremity:  Left Lower Extremity  Skin clean dry and intact, without signs of infection   Incision well-healed. Sutures were removed and Steri-Strips applied. mild edema noted to the ankle  Compartments supple throughout thigh and leg  Calf supple and not tender  negative Homans  Demonstrates active motion with knee flexion/extension, ankle dorsi/plantar flexion. Wiggles toes. States sensation intact to touch in sural, deep peroneal, superficial peroneal, saphenous, posterior tibial  nerve distributions to foot/ankle.   Palpable dorsalis pedis and posterior tibialis pulses, cap refill brisk in toes, foot warm/perfused. There were no vitals taken for this visit. XR:   3 views left ankle demonstrate ORIF left lateral malleolus fracture with the hardware in stable position and alignment. No evidence of hardware loosening or failure. Assessment:   Diagnosis Orders   1. Closed fracture of left ankle, subsequent encounter       Plan:  X-rays reviewed and discussed. Continue nonweightbearing left lower extremity. Patient was placed into a removable posterior splint today. Patient has a boot at home that she will transition into. Okay to remove the boot for hygiene, therapy, range of motion exercises of the ankle and when sitting around the house. Patient has a family member who is a therapist and will work on a home therapy program.  Plan for outpatient PT when she is able to put some weight on the left leg. If Steri-Strips do not fall off left ankle incision in the next 7 days on their own, okay to remove. Continue baby aspirin twice daily for DVT prophylaxis. Follow-up in 4 weeks for reevaluation and x-rays. Call if any questions or concerns. Electronically signed by SERGIO Golden on 6/13/2022 at 11:09 AM  Note: This report was completed using [employer] voiced recognition software. Every effort has been made to ensure accuracy; however, inadvertent computerized transcription errors may be present.

## 2022-06-13 NOTE — PATIENT INSTRUCTIONS
Continue nonweightbearing left lower extremity. Patient has a boot at home that she will transition into. Okay to remove the boot for hygiene, therapy, range of motion exercises of the ankle and when sitting around the house. Patient has a family member who is a therapist and will work on a home therapy program.  Plan for outpatient PT when she is able to put some weight on the left leg. If Steri-Strips do not fall off left ankle incision in the next 7 days on their own, okay to remove. Continue baby aspirin twice daily for DVT prophylaxis. Follow-up in 4 weeks for reevaluation and x-rays. Call if any questions or concerns. No

## 2022-06-27 ENCOUNTER — HOSPITAL ENCOUNTER (OUTPATIENT)
Age: 62
Setting detail: OBSERVATION
Discharge: HOME | End: 2022-06-28
Attending: EMERGENCY MEDICINE | Admitting: STUDENT IN AN ORGANIZED HEALTH CARE EDUCATION/TRAINING PROGRAM
Payer: COMMERCIAL

## 2022-06-27 ENCOUNTER — APPOINTMENT (OUTPATIENT)
Dept: GENERAL RADIOLOGY | Age: 62
End: 2022-06-27
Payer: COMMERCIAL

## 2022-06-27 ENCOUNTER — APPOINTMENT (OUTPATIENT)
Dept: CT IMAGING | Age: 62
End: 2022-06-27
Payer: COMMERCIAL

## 2022-06-27 DIAGNOSIS — I26.99 PULMONARY EMBOLISM, OTHER, UNSPECIFIED CHRONICITY, UNSPECIFIED WHETHER ACUTE COR PULMONALE PRESENT (HCC): Primary | ICD-10-CM

## 2022-06-27 LAB
ADENOVIRUS BY PCR: NOT DETECTED
ALBUMIN SERPL-MCNC: 4.4 G/DL (ref 3.5–5.2)
ALP BLD-CCNC: 106 U/L (ref 35–104)
ALT SERPL-CCNC: 9 U/L (ref 0–32)
AMORPHOUS: ABNORMAL
ANION GAP SERPL CALCULATED.3IONS-SCNC: 15 MMOL/L (ref 7–16)
APTT: 26.2 SEC (ref 24.5–35.1)
AST SERPL-CCNC: 13 U/L (ref 0–31)
BACTERIA: ABNORMAL /HPF
BASOPHILS ABSOLUTE: 0.03 E9/L (ref 0–0.2)
BASOPHILS RELATIVE PERCENT: 0.3 % (ref 0–2)
BILIRUB SERPL-MCNC: 1.1 MG/DL (ref 0–1.2)
BILIRUBIN URINE: NEGATIVE
BLOOD, URINE: ABNORMAL
BORDETELLA PARAPERTUSSIS BY PCR: NOT DETECTED
BORDETELLA PERTUSSIS BY PCR: NOT DETECTED
BUN BLDV-MCNC: 9 MG/DL (ref 6–23)
CALCIUM SERPL-MCNC: 9.4 MG/DL (ref 8.6–10.2)
CHLAMYDOPHILIA PNEUMONIAE BY PCR: NOT DETECTED
CHLORIDE BLD-SCNC: 100 MMOL/L (ref 98–107)
CLARITY: CLEAR
CO2: 21 MMOL/L (ref 22–29)
COLOR: YELLOW
CORONAVIRUS 229E BY PCR: NOT DETECTED
CORONAVIRUS HKU1 BY PCR: NOT DETECTED
CORONAVIRUS NL63 BY PCR: NOT DETECTED
CORONAVIRUS OC43 BY PCR: NOT DETECTED
CREAT SERPL-MCNC: 0.8 MG/DL (ref 0.5–1)
EKG ATRIAL RATE: 82 BPM
EKG P AXIS: 15 DEGREES
EKG P-R INTERVAL: 174 MS
EKG Q-T INTERVAL: 372 MS
EKG QRS DURATION: 88 MS
EKG QTC CALCULATION (BAZETT): 434 MS
EKG R AXIS: -14 DEGREES
EKG T AXIS: 46 DEGREES
EKG VENTRICULAR RATE: 82 BPM
EOSINOPHILS ABSOLUTE: 0 E9/L (ref 0.05–0.5)
EOSINOPHILS RELATIVE PERCENT: 0 % (ref 0–6)
GFR AFRICAN AMERICAN: >60
GFR NON-AFRICAN AMERICAN: >60 ML/MIN/1.73
GLUCOSE BLD-MCNC: 116 MG/DL (ref 74–99)
GLUCOSE URINE: NEGATIVE MG/DL
HCT VFR BLD CALC: 40.5 % (ref 34–48)
HEMOGLOBIN: 13.3 G/DL (ref 11.5–15.5)
HUMAN METAPNEUMOVIRUS BY PCR: NOT DETECTED
HUMAN RHINOVIRUS/ENTEROVIRUS BY PCR: NOT DETECTED
IMMATURE GRANULOCYTES #: 0.04 E9/L
IMMATURE GRANULOCYTES %: 0.4 % (ref 0–5)
INFLUENZA A BY PCR: NOT DETECTED
INFLUENZA B BY PCR: NOT DETECTED
KETONES, URINE: 40 MG/DL
LEUKOCYTE ESTERASE, URINE: ABNORMAL
LYMPHOCYTES ABSOLUTE: 0.8 E9/L (ref 1.5–4)
LYMPHOCYTES RELATIVE PERCENT: 7.1 % (ref 20–42)
MCH RBC QN AUTO: 29.5 PG (ref 26–35)
MCHC RBC AUTO-ENTMCNC: 32.8 % (ref 32–34.5)
MCV RBC AUTO: 89.8 FL (ref 80–99.9)
MONOCYTES ABSOLUTE: 0.66 E9/L (ref 0.1–0.95)
MONOCYTES RELATIVE PERCENT: 5.8 % (ref 2–12)
MUCUS: PRESENT /LPF
MYCOPLASMA PNEUMONIAE BY PCR: NOT DETECTED
NEUTROPHILS ABSOLUTE: 9.78 E9/L (ref 1.8–7.3)
NEUTROPHILS RELATIVE PERCENT: 86.4 % (ref 43–80)
NITRITE, URINE: POSITIVE
PARAINFLUENZA VIRUS 1 BY PCR: NOT DETECTED
PARAINFLUENZA VIRUS 2 BY PCR: NOT DETECTED
PARAINFLUENZA VIRUS 3 BY PCR: NOT DETECTED
PARAINFLUENZA VIRUS 4 BY PCR: NOT DETECTED
PDW BLD-RTO: 12.3 FL (ref 11.5–15)
PH UA: 5 (ref 5–9)
PLATELET # BLD: 245 E9/L (ref 130–450)
PMV BLD AUTO: 9.5 FL (ref 7–12)
POTASSIUM REFLEX MAGNESIUM: 3.9 MMOL/L (ref 3.5–5)
PROCALCITONIN: 0.04 NG/ML (ref 0–0.08)
PROTEIN UA: NEGATIVE MG/DL
RBC # BLD: 4.51 E12/L (ref 3.5–5.5)
RBC UA: ABNORMAL /HPF (ref 0–2)
RESPIRATORY SYNCYTIAL VIRUS BY PCR: NOT DETECTED
SARS-COV-2, PCR: NOT DETECTED
SODIUM BLD-SCNC: 136 MMOL/L (ref 132–146)
SPECIFIC GRAVITY UA: 1.01 (ref 1–1.03)
TOTAL PROTEIN: 7.3 G/DL (ref 6.4–8.3)
TROPONIN, HIGH SENSITIVITY: 6 NG/L (ref 0–9)
UROBILINOGEN, URINE: 0.2 E.U./DL
WBC # BLD: 11.3 E9/L (ref 4.5–11.5)
WBC UA: ABNORMAL /HPF (ref 0–5)

## 2022-06-27 PROCEDURE — 6370000000 HC RX 637 (ALT 250 FOR IP): Performed by: INTERNAL MEDICINE

## 2022-06-27 PROCEDURE — 0202U NFCT DS 22 TRGT SARS-COV-2: CPT

## 2022-06-27 PROCEDURE — 84145 PROCALCITONIN (PCT): CPT

## 2022-06-27 PROCEDURE — 6360000002 HC RX W HCPCS: Performed by: EMERGENCY MEDICINE

## 2022-06-27 PROCEDURE — 71045 X-RAY EXAM CHEST 1 VIEW: CPT

## 2022-06-27 PROCEDURE — 81001 URINALYSIS AUTO W/SCOPE: CPT

## 2022-06-27 PROCEDURE — 36415 COLL VENOUS BLD VENIPUNCTURE: CPT

## 2022-06-27 PROCEDURE — 99285 EMERGENCY DEPT VISIT HI MDM: CPT

## 2022-06-27 PROCEDURE — 96365 THER/PROPH/DIAG IV INF INIT: CPT

## 2022-06-27 PROCEDURE — 80053 COMPREHEN METABOLIC PANEL: CPT

## 2022-06-27 PROCEDURE — 6360000002 HC RX W HCPCS

## 2022-06-27 PROCEDURE — 71275 CT ANGIOGRAPHY CHEST: CPT

## 2022-06-27 PROCEDURE — 6360000004 HC RX CONTRAST MEDICATION: Performed by: RADIOLOGY

## 2022-06-27 PROCEDURE — 96374 THER/PROPH/DIAG INJ IV PUSH: CPT

## 2022-06-27 PROCEDURE — 74176 CT ABD & PELVIS W/O CONTRAST: CPT

## 2022-06-27 PROCEDURE — 93005 ELECTROCARDIOGRAM TRACING: CPT

## 2022-06-27 PROCEDURE — 96375 TX/PRO/DX INJ NEW DRUG ADDON: CPT

## 2022-06-27 PROCEDURE — 96376 TX/PRO/DX INJ SAME DRUG ADON: CPT

## 2022-06-27 PROCEDURE — 99220 PR INITIAL OBSERVATION CARE/DAY 70 MINUTES: CPT | Performed by: STUDENT IN AN ORGANIZED HEALTH CARE EDUCATION/TRAINING PROGRAM

## 2022-06-27 PROCEDURE — 85025 COMPLETE CBC W/AUTO DIFF WBC: CPT

## 2022-06-27 PROCEDURE — 85730 THROMBOPLASTIN TIME PARTIAL: CPT

## 2022-06-27 PROCEDURE — 84484 ASSAY OF TROPONIN QUANT: CPT

## 2022-06-27 PROCEDURE — G0378 HOSPITAL OBSERVATION PER HR: HCPCS

## 2022-06-27 PROCEDURE — 6370000000 HC RX 637 (ALT 250 FOR IP): Performed by: STUDENT IN AN ORGANIZED HEALTH CARE EDUCATION/TRAINING PROGRAM

## 2022-06-27 PROCEDURE — 96366 THER/PROPH/DIAG IV INF ADDON: CPT

## 2022-06-27 RX ORDER — FENTANYL CITRATE 50 UG/ML
50 INJECTION, SOLUTION INTRAMUSCULAR; INTRAVENOUS ONCE
Status: COMPLETED | OUTPATIENT
Start: 2022-06-27 | End: 2022-06-27

## 2022-06-27 RX ORDER — ANTIMONY POTASSIUM TARTRATE
POWDER (GRAM) MISCELLANEOUS
COMMUNITY
End: 2022-06-27

## 2022-06-27 RX ORDER — LEVOTHYROXINE SODIUM 0.1 MG/1
100 TABLET ORAL EVERY MORNING
Status: DISCONTINUED | OUTPATIENT
Start: 2022-06-27 | End: 2022-06-28 | Stop reason: HOSPADM

## 2022-06-27 RX ORDER — TOLTERODINE 4 MG/1
4 CAPSULE, EXTENDED RELEASE ORAL EVERY MORNING
COMMUNITY

## 2022-06-27 RX ORDER — HEPARIN SODIUM 10000 [USP'U]/100ML
5-30 INJECTION, SOLUTION INTRAVENOUS CONTINUOUS
Status: DISCONTINUED | OUTPATIENT
Start: 2022-06-27 | End: 2022-06-27

## 2022-06-27 RX ORDER — FENTANYL CITRATE 50 UG/ML
25 INJECTION, SOLUTION INTRAMUSCULAR; INTRAVENOUS ONCE
Status: DISCONTINUED | OUTPATIENT
Start: 2022-06-27 | End: 2022-06-27

## 2022-06-27 RX ORDER — TROSPIUM CHLORIDE 20 MG/1
20 TABLET, FILM COATED ORAL
Status: DISCONTINUED | OUTPATIENT
Start: 2022-06-27 | End: 2022-06-28 | Stop reason: HOSPADM

## 2022-06-27 RX ORDER — HEPARIN SODIUM 1000 [USP'U]/ML
40 INJECTION, SOLUTION INTRAVENOUS; SUBCUTANEOUS PRN
Status: DISCONTINUED | OUTPATIENT
Start: 2022-06-27 | End: 2022-06-27

## 2022-06-27 RX ORDER — HEPARIN SODIUM 1000 [USP'U]/ML
80 INJECTION, SOLUTION INTRAVENOUS; SUBCUTANEOUS PRN
Status: DISCONTINUED | OUTPATIENT
Start: 2022-06-27 | End: 2022-06-27

## 2022-06-27 RX ORDER — BUPROPION HYDROCHLORIDE 150 MG/1
150 TABLET ORAL EVERY MORNING
COMMUNITY

## 2022-06-27 RX ORDER — HYDROCODONE BITARTRATE AND ACETAMINOPHEN 5; 325 MG/1; MG/1
1 TABLET ORAL EVERY 6 HOURS PRN
Status: ON HOLD | COMMUNITY
End: 2022-06-28 | Stop reason: HOSPADM

## 2022-06-27 RX ORDER — ASPIRIN 81 MG/1
81 TABLET ORAL 2 TIMES DAILY
COMMUNITY
End: 2022-08-24

## 2022-06-27 RX ORDER — TOLTERODINE 4 MG/1
4 CAPSULE, EXTENDED RELEASE ORAL EVERY MORNING
Status: DISCONTINUED | OUTPATIENT
Start: 2022-06-27 | End: 2022-06-27 | Stop reason: CLARIF

## 2022-06-27 RX ORDER — HEPARIN SODIUM 1000 [USP'U]/ML
80 INJECTION, SOLUTION INTRAVENOUS; SUBCUTANEOUS ONCE
Status: COMPLETED | OUTPATIENT
Start: 2022-06-27 | End: 2022-06-27

## 2022-06-27 RX ORDER — ACETAMINOPHEN 325 MG/1
650 TABLET ORAL EVERY 4 HOURS PRN
Status: DISCONTINUED | OUTPATIENT
Start: 2022-06-27 | End: 2022-06-28 | Stop reason: HOSPADM

## 2022-06-27 RX ORDER — DULOXETIN HYDROCHLORIDE 30 MG/1
30 CAPSULE, DELAYED RELEASE ORAL EVERY MORNING
Status: DISCONTINUED | OUTPATIENT
Start: 2022-06-27 | End: 2022-06-28 | Stop reason: HOSPADM

## 2022-06-27 RX ORDER — BUPROPION HYDROCHLORIDE 150 MG/1
150 TABLET ORAL EVERY MORNING
Status: DISCONTINUED | OUTPATIENT
Start: 2022-06-27 | End: 2022-06-28 | Stop reason: HOSPADM

## 2022-06-27 RX ADMIN — FENTANYL CITRATE 50 MCG: 50 INJECTION, SOLUTION INTRAMUSCULAR; INTRAVENOUS at 09:45

## 2022-06-27 RX ADMIN — HEPARIN SODIUM 5950 UNITS: 1000 INJECTION INTRAVENOUS; SUBCUTANEOUS at 11:06

## 2022-06-27 RX ADMIN — APIXABAN 10 MG: 5 TABLET, FILM COATED ORAL at 15:20

## 2022-06-27 RX ADMIN — Medication 18 UNITS/KG/HR: at 11:09

## 2022-06-27 RX ADMIN — ACETAMINOPHEN 650 MG: 325 TABLET ORAL at 16:35

## 2022-06-27 RX ADMIN — APIXABAN 10 MG: 5 TABLET, FILM COATED ORAL at 20:28

## 2022-06-27 RX ADMIN — IOPAMIDOL 75 ML: 755 INJECTION, SOLUTION INTRAVENOUS at 10:01

## 2022-06-27 RX ADMIN — TROSPIUM CHLORIDE 20 MG: 20 TABLET, FILM COATED ORAL at 18:42

## 2022-06-27 ASSESSMENT — PAIN - FUNCTIONAL ASSESSMENT
PAIN_FUNCTIONAL_ASSESSMENT: NONE - DENIES PAIN
PAIN_FUNCTIONAL_ASSESSMENT: 0-10

## 2022-06-27 ASSESSMENT — ENCOUNTER SYMPTOMS
SHORTNESS OF BREATH: 1
ABDOMINAL PAIN: 0
TROUBLE SWALLOWING: 0
NAUSEA: 0
WHEEZING: 0
BACK PAIN: 0
VOICE CHANGE: 0
VOMITING: 0
DIARRHEA: 0
PHOTOPHOBIA: 0

## 2022-06-27 ASSESSMENT — PAIN DESCRIPTION - PAIN TYPE: TYPE: ACUTE PAIN

## 2022-06-27 ASSESSMENT — PAIN DESCRIPTION - LOCATION
LOCATION: ABDOMEN;RIB CAGE
LOCATION: CHEST
LOCATION: CHEST

## 2022-06-27 ASSESSMENT — PAIN SCALES - GENERAL
PAINLEVEL_OUTOF10: 3
PAINLEVEL_OUTOF10: 4
PAINLEVEL_OUTOF10: 9

## 2022-06-27 ASSESSMENT — PAIN DESCRIPTION - DESCRIPTORS
DESCRIPTORS: SHARP
DESCRIPTORS: SHARP;SHOOTING

## 2022-06-27 ASSESSMENT — PAIN DESCRIPTION - ORIENTATION: ORIENTATION: LEFT

## 2022-06-27 NOTE — PROGRESS NOTES
Admission database completed to best of this RN's ability. Care plan and education initiated. Pt independent from home with . Uses a knee scooter with ambulation d/t recent L ankle surgery. Denies any [place] [id number] services prior to admission.

## 2022-06-27 NOTE — H&P
[facility] History and Physical      CHIEF COMPLAINT:  Shortness of breath, left sided chest that started 2 days ago, worsening with deep inspiration, accompanied with shortness of breath    History of Present Illness: Patient is a 62-year-old female with past medical history significant for depression, hypothyroidism who recently underwent left ankle open reduction internal fixation for left ankle fracture, May 28, 2022, was on aspirin 81 mg twice daily and presented to emergency department for the concerns of left-sided chest pain, that worsens with deep inspiration, accompanied with shortness of breath, and 9 out of 10 in intensity. On further  imaging patient was found to have bilateral segmental and subsegmental pulmonary emboli with left lower lobe possible infarct versus infiltrates. Vital signs are stable, patient is saturating well on room air. Patient denies any family hx of clotting disorders, smoking hx, hx of cancers, harmone therapy. Informant(s) for H&P: Patient and documentation    REVIEW OF SYSTEMS:  A comprehensive review of systems was negative except for: what is in the HPI      PMH:  Past Medical History:   Diagnosis Date    Depression     Thyroid disease        Surgical History:  Past Surgical History:   Procedure Laterality Date    ANKLE FRACTURE SURGERY Left 5/28/2022    ANKLE OPEN REDUCTION INTERNAL FIXATION performed by Deisy Brooke MD at [address] [place] [address]         Medications Prior to Admission:    Prior to Admission medications    Medication Sig Start Date End Date Taking?  Authorizing Provider   buPROPion (WELLBUTRIN XL) 150 MG extended release tablet Take 150 mg by mouth every morning   Yes Historical Provider, MD   aspirin 81 MG EC tablet Take 81 mg by mouth 2 times daily   Yes Historical Provider, MD   tolterodine (DETROL LA) 4 MG extended release capsule Take 4 mg by mouth every morning Yes Historical Provider, MD   HYDROcodone-acetaminophen (NORCO) 5-325 MG per tablet Take 1 tablet by mouth every 6 hours as needed for Pain. Yes Historical Provider, MD   aspirin EC 81 MG EC tablet Take 1 tablet by mouth 2 times daily for 28 days 5/28/22 6/25/22  Sydney Slater    DULoxetine (CYMBALTA) 30 MG extended release capsule Take 30 mg by mouth every morning     Historical Provider, MD   levothyroxine (SYNTHROID) 25 MCG tablet Take 100 mcg by mouth every morning     Historical Provider, MD       Allergies:    Patient has no known allergies. Social History:    reports that she has never smoked. She has never used smokeless tobacco. She reports current alcohol use. She reports that she does not use drugs. Family History:   family history is not on file. PHYSICAL EXAM:  Vitals:  /75   Pulse 84   Temp 98 °F (36.7 °C) (Oral)   Resp 16   Ht 5' 9\" (1.753 m)   Wt 164 lb (74.4 kg)   SpO2 95%   BMI 24.22 kg/m²     General Appearance: alert and oriented to person, place and time and in no acute distress  Skin: warm and dry  Head: normocephalic and atraumatic  Eyes: pupils equal, round, and reactive to light, extraocular eye movements intact, conjunctivae normal  Neck: neck supple and non tender without mass   Pulmonary/Chest: clear to auscultation bilaterally- no wheezes, rales or rhonchi, normal air movement, no respiratory distress  Cardiovascular: normal rate, normal S1 and S2 and no carotid bruits  Abdomen: soft, non-tender, non-distended, normal bowel sounds, no masses or organomegaly  Extremities: no cyanosis, no clubbing and no edema.  Left foot, dressing C/D/I, restricted ROM  Neurologic: no cranial nerve deficit and speech normal    LABS:  Recent Labs     06/27/22  0820      K 3.9      CO2 21*   BUN 9   CREATININE 0.8   GLUCOSE 116*   CALCIUM 9.4       Recent Labs     06/27/22  0820   WBC 11.3   RBC 4.51   HGB 13.3   HCT 40.5   MCV 89.8   MCH 29.5   MCHC 32.8   RDW 12.3    MPV 9.5       No results for input(s): POCGLU in the last 72 hours. Radiology:   CTA PULMONARY W CONTRAST   Final Result   1. Bilateral pulmonary emboli. There are pulmonary emboli seen within the   segmental branches of the right middle and right lower lobes and there are   pulmonary emboli seen within the segmental and subsegmental pulmonary   arteries of the left lower lobe. 2. Right lower lobe airspace disease which is favored to represent   atelectasis. 3. Left lower lobe airspace disease which could represent pneumonia or   pulmonary infarct. 4. Trace left pleural effusion. 5. There are no findings of right ventricular strain. CT ABDOMEN PELVIS WO CONTRAST Additional Contrast? None   Final Result   Opacities in the bilateral lung basis. Please refer to CT chest, same day   for further detail. No hydronephrosis. No evidence of renal or ureteral calculus. XR CHEST PORTABLE   Final Result   1. Patchy bibasilar airspace disease   2. Trace left pleural effusion. EKG: NSR      ASSESSMENT:      Principal Problem:    Acute pulmonary embolism (HCC)  Resolved Problems:    * No resolved hospital problems. *      PLAN:    1. Acute bilateral segmental and subsegmental PE with possible left lower lobe infarct vs infiltrates-patient remained stable on room air, was started on heparin drip, will change to Eliquis, follow-up thrombophilia panel, respiratory panel, Pro-Ajay.  Most likely provoked event given recent immobilization postsurgically, will need outpatient follow-up with PCP and continuation of at least 3 months of anticoagulation. 2.  Abnormal UA -will monitor for signs of infection, follow-up urine culture. 3.  Depression continue home meds    4. Hypothyroidism on replacement    5.  Recent left ankle open reduction internal fixation for left ankle fracture, May 28, 2022, was on aspirin 81 mg twice daily     Code Status: Full  DVT prophylaxis: Heparin gtt    NOTE: This report was transcribed using voice recognition software. Every effort was made to ensure accuracy; however, inadvertent computerized transcription errors may be present.   Electronically signed by Clarissa Bobby MD on 6/27/2022 at 12:28 PM

## 2022-06-27 NOTE — ED NOTES
LAVELL faxed to floor. Spoke to Bashir Gonsales who stated she received it. Pt ready to go up.       Abbi Vazquez RN  06/27/22 [id number]

## 2022-06-28 VITALS
WEIGHT: 155 LBS | RESPIRATION RATE: 16 BRPM | HEIGHT: 69 IN | TEMPERATURE: 99.1 F | HEART RATE: 87 BPM | OXYGEN SATURATION: 94 % | SYSTOLIC BLOOD PRESSURE: 111 MMHG | DIASTOLIC BLOOD PRESSURE: 62 MMHG | BODY MASS INDEX: 22.96 KG/M2

## 2022-06-28 LAB
ANION GAP SERPL CALCULATED.3IONS-SCNC: 12 MMOL/L (ref 7–16)
BUN BLDV-MCNC: 10 MG/DL (ref 6–23)
CALCIUM SERPL-MCNC: 9.3 MG/DL (ref 8.6–10.2)
CHLORIDE BLD-SCNC: 104 MMOL/L (ref 98–107)
CO2: 24 MMOL/L (ref 22–29)
CREAT SERPL-MCNC: 0.8 MG/DL (ref 0.5–1)
GFR AFRICAN AMERICAN: >60
GFR NON-AFRICAN AMERICAN: >60 ML/MIN/1.73
GLUCOSE BLD-MCNC: 111 MG/DL (ref 74–99)
HCT VFR BLD CALC: 39 % (ref 34–48)
HEMOGLOBIN: 12.7 G/DL (ref 11.5–15.5)
MCH RBC QN AUTO: 29.1 PG (ref 26–35)
MCHC RBC AUTO-ENTMCNC: 32.6 % (ref 32–34.5)
MCV RBC AUTO: 89.4 FL (ref 80–99.9)
PDW BLD-RTO: 12.3 FL (ref 11.5–15)
PLATELET # BLD: 241 E9/L (ref 130–450)
PMV BLD AUTO: 9.4 FL (ref 7–12)
POTASSIUM SERPL-SCNC: 4.3 MMOL/L (ref 3.5–5)
RBC # BLD: 4.36 E12/L (ref 3.5–5.5)
SODIUM BLD-SCNC: 140 MMOL/L (ref 132–146)
WBC # BLD: 8.3 E9/L (ref 4.5–11.5)

## 2022-06-28 PROCEDURE — 36415 COLL VENOUS BLD VENIPUNCTURE: CPT

## 2022-06-28 PROCEDURE — 96366 THER/PROPH/DIAG IV INF ADDON: CPT

## 2022-06-28 PROCEDURE — 6370000000 HC RX 637 (ALT 250 FOR IP): Performed by: STUDENT IN AN ORGANIZED HEALTH CARE EDUCATION/TRAINING PROGRAM

## 2022-06-28 PROCEDURE — 85027 COMPLETE CBC AUTOMATED: CPT

## 2022-06-28 PROCEDURE — G0378 HOSPITAL OBSERVATION PER HR: HCPCS

## 2022-06-28 PROCEDURE — 99217 PR OBSERVATION CARE DISCHARGE MANAGEMENT: CPT | Performed by: STUDENT IN AN ORGANIZED HEALTH CARE EDUCATION/TRAINING PROGRAM

## 2022-06-28 PROCEDURE — 80048 BASIC METABOLIC PNL TOTAL CA: CPT

## 2022-06-28 RX ORDER — LEVOTHYROXINE SODIUM 0.1 MG/1
100 TABLET ORAL EVERY MORNING
Qty: 30 TABLET | Refills: 3 | Status: SHIPPED | OUTPATIENT
Start: 2022-06-29

## 2022-06-28 RX ADMIN — APIXABAN 10 MG: 5 TABLET, FILM COATED ORAL at 08:35

## 2022-06-28 RX ADMIN — DULOXETINE HYDROCHLORIDE 30 MG: 30 CAPSULE, DELAYED RELEASE ORAL at 08:35

## 2022-06-28 RX ADMIN — LEVOTHYROXINE SODIUM 100 MCG: 100 TABLET ORAL at 06:35

## 2022-06-28 RX ADMIN — BUPROPION HYDROCHLORIDE 150 MG: 150 TABLET, EXTENDED RELEASE ORAL at 08:35

## 2022-06-28 ASSESSMENT — PAIN SCALES - GENERAL: PAINLEVEL_OUTOF10: 0

## 2022-06-28 NOTE — PROGRESS NOTES
CLINICAL PHARMACY NOTE: MEDS TO BEDS    Total # of Prescriptions Filled: 1   The following medications were delivered to the patient:  · eliquis 5 mg    Additional Documentation:levothyroxine placed on hold per patient request

## 2022-06-28 NOTE — CARE COORDINATION
Met with pt; resides independently with sppuse; recent ankle surgery May 28th for fracture repair. Uses knee scooter. PCP Dr.Ed Bibiana Minor. Pt admitted for PE; started on Eliquis; 30 day free and $10 co-pay cards given with instruction. Advised pt to follow up with physicians at discharge to assure continuance of this medication. Pt understands and agrees. Damir Blank.

## 2022-06-28 NOTE — DISCHARGE SUMMARY
[facility] Physician Discharge Summary       No follow-up provider specified. Activity level: As tolerated     Dispo: Home      Condition on discharge: Stable     Patient ID:  Tayler Beck  [id number]  89 y.o.  1960    Admit date: 6/27/2022    Discharge date and time:  6/28/2022  12:46 PM    Admission Diagnoses: Principal Problem:    Acute pulmonary embolism ([facility] [address]  Resolved Problems:    * No resolved hospital problems. *      Discharge Diagnoses: Principal Problem:    Acute pulmonary embolism ([facility] [address]  Resolved Problems:    * No resolved hospital problems. *      Consults:  None    Hospital Course:   Patient Tayler Beck is a 62 y.o. presented with Acute pulmonary embolism, unspecified pulmonary embolism type, unspecified whether acute cor pulmonale present ([facility] [address] [I26.99]   Patient was admitted and managed for Acute bilateral segmental and subsegmental PE with possible left lower lobe infarct vs infiltrates-patient remained stable on room air, was started on heparin drip, changed to Eliquis, follow-up thrombophilia panel OP, respiratory panel NEG, Pro-Ajay NEG.   Most likely provoked event given recent immobilization postsurgically,Recent left ankle open reduction internal fixation for left ankle fracture, May 28, 2022, was on aspirin 81 mg twice daily , will need outpatient follow-up with PCP and continuation of at least 3/6 months of anticoagulation with repeat imaging.    Abnormal UA -will monitor for signs of infection, follow-up urine culture OP.       Discharge Exam:  General Appearance: alert and oriented to person, place and time and in no acute distress  Skin: warm and dry  Head: normocephalic and atraumatic  Eyes: pupils equal, round, and reactive to light, extraocular eye movements intact, conjunctivae normal  Neck: neck supple and non tender without mass   Pulmonary/Chest: clear to auscultation bilaterally- no wheezes, rales or rhonchi, normal air movement, no respiratory distress  Cardiovascular: normal rate, normal S1 and S2 and no carotid bruits  Abdomen: soft, non-tender, non-distended, normal bowel sounds, no masses or organomegaly  Extremities: no cyanosis, no clubbing and no edema  Neurologic: no cranial nerve deficit and speech normal    I/O last 3 completed shifts: In: 2.3 [I.V.:2.3]  Out: -   I/O this shift:  In: 180 [P.O.:180]  Out: -       LABS:  Recent Labs     06/27/22  0820 06/28/22  0238    140   K 3.9 4.3    104   CO2 21* 24   BUN 9 10   CREATININE 0.8 0.8   GLUCOSE 116* 111*   CALCIUM 9.4 9.3       Recent Labs     06/27/22 0820 06/28/22 0238   WBC 11.3 8.3   RBC 4.51 4.36   HGB 13.3 12.7   HCT 40.5 39.0   MCV 89.8 89.4   MCH 29.5 29.1   MCHC 32.8 32.6   RDW 12.3 12.3    241   MPV 9.5 9.4       No results for input(s): POCGLU in the last 72 hours. Imaging:  CT ABDOMEN PELVIS WO CONTRAST Additional Contrast? None    Result Date: 6/27/2022  EXAMINATION: CT OF THE ABDOMEN AND PELVIS WITHOUT CONTRAST 6/27/2022 10:02 am TECHNIQUE: CT of the abdomen and pelvis was performed without the administration of intravenous contrast. Multiplanar reformatted images are provided for review. Automated exposure control, iterative reconstruction, and/or weight based adjustment of the mA/kV was utilized to reduce the radiation dose to as low as reasonably achievable. COMPARISON: None. HISTORY: ORDERING SYSTEM PROVIDED HISTORY: flank pain TECHNOLOGIST PROVIDED HISTORY: Additional Contrast?->None Reason for exam:->flank pain Decision Support Exception - unselect if not a suspected or confirmed emergency medical condition->Emergency Medical Condition (MA) FINDINGS: Lower Chest: Patchy opacities partially imaged in the bilateral lung bases. Liver: No hepatic lesions identified. Bile ducts: Gallbladder is unremarkable. No evidence of intrahepatic or extrahepatic biliary dilatation. Pancreas: The pancreatic duct is not dilated. Adrenal glands: Normal in appearance. Kidneys: No evidence of hydronephrosis. No evidence of renal or ureteral calculus. Spleen: Normal in appearance. Bowel: No evidence of bowel obstruction. The appendix is normal in appearance. Pelvis: Bladder is incompletely distended. No abnormal pelvic lymphadenopathy. Peritoneum/Retroperitoneum: No abnormal lymphadenopathy. Bones/Soft Tissues: No aggressive osseous lesions. Opacities in the bilateral lung basis. Please refer to CT chest, same day for further detail. No hydronephrosis. No evidence of renal or ureteral calculus. XR CHEST PORTABLE    Result Date: 6/27/2022  EXAMINATION: ONE XRAY VIEW OF THE CHEST 6/27/2022 8:24 am COMPARISON: 10/30/2010 HISTORY: ORDERING SYSTEM PROVIDED HISTORY: SOB, left chest wall pain TECHNOLOGIST PROVIDED HISTORY: Reason for exam:->SOB, left chest wall pain FINDINGS: The cardiac silhouette is within normal limits. Patchy bibasilar airspace disease is noted. There is a trace left pleural effusion. There are findings of roto scoliosis of the thoracic spine. 1. Patchy bibasilar airspace disease 2. Trace left pleural effusion. CTA PULMONARY W CONTRAST    Result Date: 6/27/2022  EXAMINATION: CTA OF THE CHEST 6/27/2022 10:02 am TECHNIQUE: CTA of the chest was performed after the administration of intravenous contrast.  Multiplanar reformatted images are provided for review. MIP images are provided for review. Automated exposure control, iterative reconstruction, and/or weight based adjustment of the mA/kV was utilized to reduce the radiation dose to as low as reasonably achievable. COMPARISON: None.  HISTORY: ORDERING SYSTEM PROVIDED HISTORY: surgery end of may, left rib pain, worse with inspiration, increased work of breathing, nonreproducible with palpation TECHNOLOGIST PROVIDED HISTORY: Reason for exam:->surgery end of may, left rib pain, worse with inspiration, increased work of breathing, nonreproducible with palpation Decision Support Exception - unselect if not a suspected or confirmed emergency medical condition->Emergency Medical Condition (MA) FINDINGS: Pulmonary Arteries: There is a pulmonary embolus seen within the distal right main pulmonary artery extending into the segmental pulmonary arteries of the right middle lobe and right lower lobe. There is no appreciable pulmonary embolus seen within the pulmonary arteries of the right upper lobe. Pulmonary emboli are seen within the segmental and subsegmental pulmonary arteries of the left lower lobe. Mediastinum: No evidence of mediastinal lymphadenopathy. The heart and pericardium demonstrate no acute abnormality. There is no acute abnormality of the thoracic aorta. Lungs/pleura: There is consolidation seen within 2 the right lower lobe. The appearance is favored to represent atelectasis. Pneumonia cannot be completely excluded. There is airspace disease seen within the left lower lobe favored to represent pneumonia or pulmonary infarct. There is a trace left pleural effusion Upper Abdomen:  Limited images of the upper abdomen demonstrate no acute abnormality. Soft Tissues/Bones: There is rotoscoliosis of the thoracic spine. 1. Bilateral pulmonary emboli. There are pulmonary emboli seen within the segmental branches of the right middle and right lower lobes and there are pulmonary emboli seen within the segmental and subsegmental pulmonary arteries of the left lower lobe. 2. Right lower lobe airspace disease which is favored to represent atelectasis. 3. Left lower lobe airspace disease which could represent pneumonia or pulmonary infarct. 4. Trace left pleural effusion. 5. There are no findings of right ventricular strain.        Patient Instructions:      Medication List      START taking these medications    * apixaban 5 MG Tabs tablet  Commonly known as: ELIQUIS  Take 2 tablets by mouth 2 times daily for 11 doses     * apixaban 5 MG Tabs tablet  Commonly known as: ELIQUIS  Take 1 tablet by mouth 2 times daily  Start taking on: July 4, 2022         * This list has 2 medication(s) that are the same as other medications prescribed for you. Read the directions carefully, and ask your doctor or other care provider to review them with you. CHANGE how you take these medications    levothyroxine 100 MCG tablet  Commonly known as: SYNTHROID  Take 1 tablet by mouth every morning  Start taking on: June 29, 2022  What changed: medication strength        CONTINUE taking these medications    * aspirin 81 MG EC tablet     * aspirin EC 81 MG EC tablet  Take 1 tablet by mouth 2 times daily for 28 days     buPROPion 150 MG extended release tablet  Commonly known as: WELLBUTRIN XL     DULoxetine 30 MG extended release capsule  Commonly known as: CYMBALTA     tolterodine 4 MG extended release capsule  Commonly known as: DETROL LA         * This list has 2 medication(s) that are the same as other medications prescribed for you. Read the directions carefully, and ask your doctor or other care provider to review them with you.             STOP taking these medications    HYDROcodone-acetaminophen 5-325 MG per tablet  Commonly known as: [address] [place] [address]           Where to Get Your Medications      These medications were sent to [address] [address]    Phone: [phone]   · apixaban 5 MG Tabs tablet  · apixaban 5 MG Tabs tablet  · levothyroxine 100 MCG tablet           Note that more than 30 minutes was spent in preparing discharge papers, discussing discharge with patient, medication review, etc.    Signed:  Electronically signed by Estrella Camarena MD on 6/28/2022 at 12:46 PM

## 2022-07-11 ENCOUNTER — OFFICE VISIT (OUTPATIENT)
Dept: ORTHOPEDIC SURGERY | Age: 62
End: 2022-07-11
Payer: COMMERCIAL

## 2022-07-11 ENCOUNTER — HOSPITAL ENCOUNTER (OUTPATIENT)
Dept: GENERAL RADIOLOGY | Age: 62
Discharge: HOME | End: 2022-07-13
Payer: COMMERCIAL

## 2022-07-11 DIAGNOSIS — S82.892D CLOSED FRACTURE OF LEFT ANKLE WITH ROUTINE HEALING, SUBSEQUENT ENCOUNTER: Primary | ICD-10-CM

## 2022-07-11 DIAGNOSIS — S82.892A CLOSED FRACTURE OF LEFT ANKLE, INITIAL ENCOUNTER: Primary | ICD-10-CM

## 2022-07-11 DIAGNOSIS — S82.892A CLOSED FRACTURE OF LEFT ANKLE, INITIAL ENCOUNTER: ICD-10-CM

## 2022-07-11 PROCEDURE — 99024 POSTOP FOLLOW-UP VISIT: CPT | Performed by: PHYSICIAN ASSISTANT

## 2022-07-11 PROCEDURE — L4350 ANKLE CONTROL ORTHO PRE OTS: HCPCS

## 2022-07-11 PROCEDURE — 99212 OFFICE O/P EST SF 10 MIN: CPT

## 2022-07-11 PROCEDURE — 73610 X-RAY EXAM OF ANKLE: CPT

## 2022-07-11 NOTE — PROGRESS NOTES
Chief Complaint   Patient presents with    Post-Op Check     Left ankle piter ORIF 5/28/2022. Patient compliant with NWB in CAM boot. Patient participating in HEP at this time. OP:SURGEON: Dr. Nathan Vivas MD  DATE OF PROCEDURE: 5-28-22  PROCEDURE: 1.  Open reduction internal fixation left lateral malleolus fracture     2.  Fluoroscopic stress view in the operating room of left ankle syndesmosis      Subjective:  Nikki Corrigan is approximately 6 wks follow-up from the above surgery. She has been nonweightbearing to the left lower extremity wearing a boot that she says is somewhat ill fitting. She did put her foot down the other day did have increased paresthesias to the foot and the toes, otherwise denies paresthesias or pain at the left ankle. No new injuries or any other complaints. Has not started physical therapy. Currently on Eliquis, hx of recent PE. Today does not report calf pain, CP, SOB, fever, chills, malaise. Review of Systems -  all pertinent positives and negatives in HPI. Objective:    General: Alert and oriented X 3, normocephalic atraumatic, external ears and eye normal, sclera clear, no acute distress, respirations easy and unlabored with no audible wheezes, skin warm and dry, speech and dress appropriate for noted age, affect euthymic. Extremity:  Right Lower Extremity  Skin clean dry and intact, without signs of infection  Incision healed  Nontender palpation about the ankle  Trace ankle edema noted  Compartments supple throughout thigh and leg  Calf supple and nontender  Demonstrates active knee flexion/extension, ankle plantar/dorsiflexion/great toe extension. No increase to discomfort with active DF and PF  States sensation intact to touch in sural/deep peroneal/superficial peroneal/saphenous/posterior tibial nerve distributions to foot/ankle. Palpable dorsalis pedis and posterior tibialis pulses, cap refill brisk in toes, foot warm/perfused.            XR:   3 views of L ankle demonstrating interval healing of distal fibula fracture with mortise concentric without interval widening. Hardware remains intact without interval displacement, loosening, or failure. No significant change in alignment. No acute fractures or dislocations or any other osseus abnormality identified. Assessment:   Diagnosis Orders   1. Closed fracture of left ankle, initial encounter  [facility]       Plan:   Reviewed x-rays with patient today in office    Can begin progressive weightbearing 25% every 5 to 7 days if tolerable while in boot. Patient given boot today.  Multimodal pain and edema control   PT referral placed    Follow up in 6 weeks with XR of the left ankle    Electronically signed by Peggy Tovar PA-C on 7/11/2022 at 3:10 PM  Note: This report was completed using [employer] voiced recognition software. Every effort has been made to ensure accuracy; however, inadvertent computerized transcription errors may be present.

## 2022-07-18 ENCOUNTER — EVALUATION (OUTPATIENT)
Dept: PHYSICAL THERAPY | Age: 62
End: 2022-07-18
Payer: COMMERCIAL

## 2022-07-18 DIAGNOSIS — S82.892D CLOSED FRACTURE OF LEFT ANKLE WITH ROUTINE HEALING, SUBSEQUENT ENCOUNTER: Primary | ICD-10-CM

## 2022-07-18 PROCEDURE — 97161 PT EVAL LOW COMPLEX 20 MIN: CPT | Performed by: PHYSICAL THERAPIST

## 2022-07-18 PROCEDURE — 97110 THERAPEUTIC EXERCISES: CPT | Performed by: PHYSICAL THERAPIST

## 2022-07-18 NOTE — PROGRESS NOTES
[facility]   Phone: [phone]   Fax: [phone]         Date:  2022   Patient: Andrew Patel  : 1960  MRN: [id number]  Referring Provider: Bal Kelsey PA-C  [address] [place] [address]. [place],  [address] [place] [address]     Medical Diagnosis:      Diagnosis Orders   1. Closed fracture of left ankle with routine healing, subsequent encounter            SUBJECTIVE:     Onset date: Elena Burow 23, surgical repair/ORIF 22    Onset: Sudden onset    Mechanism of Injury: Inversion ankle roll, dog bumped her with resultant fall. Previous PT: none    Medical Management for Current Problem: othotic: CAM boot     Chief complaint: edema, decreased motion, difficulty walking, difficulty with stairs, limited tolerance to weightbearing tasks and weightbearing duration    Behavior: condition is getting better    Pain: no pain   Current: 0/10     Best: 0/10     Worst:0/10    Symptom Type/Quality: N/A  Location[de-identified] Ankle: lateral side      Aggravated by: walking, stairs    Relieved by: ice    Imaging results: CT ABDOMEN PELVIS WO CONTRAST Additional Contrast? None    Result Date: 2022  EXAMINATION: CT OF THE ABDOMEN AND PELVIS WITHOUT CONTRAST 2022 10:02 am TECHNIQUE: CT of the abdomen and pelvis was performed without the administration of intravenous contrast. Multiplanar reformatted images are provided for review. Automated exposure control, iterative reconstruction, and/or weight based adjustment of the mA/kV was utilized to reduce the radiation dose to as low as reasonably achievable. COMPARISON: None. HISTORY: ORDERING SYSTEM PROVIDED HISTORY: flank pain TECHNOLOGIST PROVIDED HISTORY: Additional Contrast?->None Reason for exam:->flank pain Decision Support Exception - unselect if not a suspected or confirmed emergency medical condition->Emergency Medical Condition (MA) FINDINGS: Lower Chest: Patchy opacities partially imaged in the bilateral lung bases.  Liver: No hepatic lesions identified. Bile ducts: Gallbladder is unremarkable. No evidence of intrahepatic or extrahepatic biliary dilatation. Pancreas: The pancreatic duct is not dilated. Adrenal glands: Normal in appearance. Kidneys: No evidence of hydronephrosis. No evidence of renal or ureteral calculus. Spleen: Normal in appearance. Bowel: No evidence of bowel obstruction. The appendix is normal in appearance. Pelvis: Bladder is incompletely distended. No abnormal pelvic lymphadenopathy. Peritoneum/Retroperitoneum: No abnormal lymphadenopathy. Bones/Soft Tissues: No aggressive osseous lesions. Opacities in the bilateral lung basis. Please refer to CT chest, same day for further detail. No hydronephrosis. No evidence of renal or ureteral calculus. XR ANKLE LEFT (MIN 3 VIEWS)    Result Date: 7/11/2022  EXAMINATION: THREE XRAY VIEWS OF THE LEFT ANKLE 7/11/2022 2:36 pm COMPARISON: Left ankle series from June 13, 2022 HISTORY: ORDERING SYSTEM PROVIDED HISTORY: Closed fracture of left ankle, initial encounter TECHNOLOGIST PROVIDED HISTORY: What reading provider will be dictating this exam?->CRC FINDINGS: Again demonstrated are postsurgical changes to the distal left fibula. No hardware complications. Corticated ossific fragment distal to the medial malleolus is also unchanged. Ankle mortise is maintained. Normal appearance of the talar dome and the base of the 5th metatarsal.  Anterior calcaneal enthesophyte. There is some residual soft tissue swelling and small joint effusion     Postsurgical changes to the distal left fibula. No hardware complications. Persistent soft tissue swelling and small joint effusion.      XR CHEST PORTABLE    Result Date: 6/27/2022  EXAMINATION: ONE XRAY VIEW OF THE CHEST 6/27/2022 8:24 am COMPARISON: 10/30/2010 HISTORY: ORDERING SYSTEM PROVIDED HISTORY: SOB, left chest wall pain TECHNOLOGIST PROVIDED HISTORY: Reason for exam:->SOB, left chest wall pain FINDINGS: The cardiac silhouette is within normal limits. Patchy bibasilar airspace disease is noted. There is a trace left pleural effusion. There are findings of roto scoliosis of the thoracic spine. 1. Patchy bibasilar airspace disease 2. Trace left pleural effusion. CTA PULMONARY W CONTRAST    Result Date: 6/27/2022  EXAMINATION: CTA OF THE CHEST 6/27/2022 10:02 am TECHNIQUE: CTA of the chest was performed after the administration of intravenous contrast.  Multiplanar reformatted images are provided for review. MIP images are provided for review. Automated exposure control, iterative reconstruction, and/or weight based adjustment of the mA/kV was utilized to reduce the radiation dose to as low as reasonably achievable. COMPARISON: None. HISTORY: ORDERING SYSTEM PROVIDED HISTORY: surgery end of may, left rib pain, worse with inspiration, increased work of breathing, nonreproducible with palpation TECHNOLOGIST PROVIDED HISTORY: Reason for exam:->surgery end of may, left rib pain, worse with inspiration, increased work of breathing, nonreproducible with palpation Decision Support Exception - unselect if not a suspected or confirmed emergency medical condition->Emergency Medical Condition (MA) FINDINGS: Pulmonary Arteries: There is a pulmonary embolus seen within the distal right main pulmonary artery extending into the segmental pulmonary arteries of the right middle lobe and right lower lobe. There is no appreciable pulmonary embolus seen within the pulmonary arteries of the right upper lobe. Pulmonary emboli are seen within the segmental and subsegmental pulmonary arteries of the left lower lobe. Mediastinum: No evidence of mediastinal lymphadenopathy. The heart and pericardium demonstrate no acute abnormality. There is no acute abnormality of the thoracic aorta. Lungs/pleura: There is consolidation seen within 2 the right lower lobe. The appearance is favored to represent atelectasis. Pneumonia cannot be completely excluded.  There is airspace disease seen within the left lower lobe favored to represent pneumonia or pulmonary infarct. There is a trace left pleural effusion Upper Abdomen:  Limited images of the upper abdomen demonstrate no acute abnormality. Soft Tissues/Bones: There is rotoscoliosis of the thoracic spine. 1. Bilateral pulmonary emboli. There are pulmonary emboli seen within the segmental branches of the right middle and right lower lobes and there are pulmonary emboli seen within the segmental and subsegmental pulmonary arteries of the left lower lobe. 2. Right lower lobe airspace disease which is favored to represent atelectasis. 3. Left lower lobe airspace disease which could represent pneumonia or pulmonary infarct. 4. Trace left pleural effusion. 5. There are no findings of right ventricular strain.        Past Medical History  Past Medical History:   Diagnosis Date    Depression     Thyroid disease      Past Surgical History:   Procedure Laterality Date    ANKLE FRACTURE SURGERY Left 5/28/2022    ANKLE OPEN REDUCTION INTERNAL FIXATION performed by Grace Clark MD at [address]         Medications:   Current Outpatient Medications   Medication Sig Dispense Refill    apixaban (ELIQUIS) 5 MG TABS tablet Take 2 tablets by mouth 2 times daily for 11 doses 22 tablet 0    apixaban (ELIQUIS) 5 MG TABS tablet Take 1 tablet by mouth 2 times daily 60 tablet 0    levothyroxine (SYNTHROID) 100 MCG tablet Take 1 tablet by mouth every morning 30 tablet 3    [START ON 8/4/2022] apixaban (ELIQUIS) 5 MG TABS tablet Take 1 tablet by mouth 2 times daily (Patient not taking: Reported on 7/11/2022) 180 tablet 1    buPROPion (WELLBUTRIN XL) 150 MG extended release tablet Take 150 mg by mouth every morning      aspirin 81 MG EC tablet Take 81 mg by mouth 2 times daily (Patient not taking: Reported on 7/11/2022)      tolterodine (DETROL LA) 4 MG extended release capsule Take 4 mg by mouth every morning      aspirin EC 81 MG EC tablet Take 1 tablet by mouth 2 times daily for 28 days 56 tablet 0    DULoxetine (CYMBALTA) 30 MG extended release capsule Take 30 mg by mouth every morning        No current facility-administered medications for this visit. Occupation:  supervsior . Physical demands include: walking, standing, sitting, desk based job  . Status: full time    Exercise regimen: walking    Hobbies: yard work, crocheting     Patient Goals:  improve ROM and get back to walking     Precautions/Contraindications: recent surgery, ankle protocol per surgeon -- start at 6 week phase, currently 20% WB.    OBJECTIVE:     Observations: well nourished female    Inspection:  Pt presents with CAM boot and ACE wrap to left ankle. Mild erythema of foot and ankle. Incision well healed, no open areas with good scar mobility noted. Edema: moderate lateral    Figure 8:   L: 54 cm  R: 48.5 cm    Gait: antalgic gait, ambulates with crutches, 3 point gait with crutches     Joint/Motion:    Ankle:  Right:   AROM: 10° Dorsiflexion, 50° Plantarflexion, 65° Inversion, 12° Eversion     Left:   AROM: 0° Dorsiflexion, 48° Plantarflexion, 20° Inversion, 10° Eversion  PROM: 14° Dorsiflexion,  60° Plantarflexion, 28° Inversion, 25° Eversion      Strength:     Ankle:  Right: Dorsiflexion 4/5, Plantarflexion 4/5, Inversion 4/5, Eversion 4/5  Left: Dorsiflexion 3/5, Plantarflexion 3/5, Inversion 3/5, Eversion 3/5  overpressure not applied per protocol     Palpation: No tenderness     Special Tests/Functional Screens:   [] Anterior Drawer []+ / [] -  [] Eversion Stress: []+ / [] -  [] Knight Test []+ / [] -     [] Tib-Fib Compression Test []+ / [] -    [] Inversion Stress []+ / [] -     [] Squeeze Test []+ / [] -   [] Naresh's Sign []+ / [] -   [] Other: []+ / []      Special test comments: n/a      ASSESSMENT     Outcome Measure:   Lower Extremity Functional Scale (LEFS):40/80    Problems:   Pain Evaluation: Low Complexity, [id number] PT Re-Evaluation, [phone] Therapeutic Exercise, [id number] Neuromuscular Re-Education, 07180 Therapeutic Activities, 85358 Manual Therapy, and 35896 Gait Training      Suggested Professional Referral: [x] No  [] Yes:     Patient Education:  [x] Plans/Goals, Risks/Benefits discussed  [x] Home exercise program  Method of Education: [x] Verbal  [x] Demo  [x] Written  Comprehension of Education:  [x] Verbalizes understanding. [x] Demonstrates understanding. [] Needs Review. [] Demonstrates/verbalizes understanding of HEP/Ed previously given. Frequency: 1-2 days per week for 8 weeks    Patient understands diagnosis/prognosis and consents to treatment, plan and goals: [x] Yes    [] No     Thank you for the opportunity to work with your patient. If you have questions or comments, please contact me at numbers listed above. Electronically signed by:   HENRRY Rivera, PT, DPT  [id number]    Medicare Patients Only     Please sign Physician's Certification and return to: [address]  [facility]  [address] [facility] [address] [address]  Dept: [phone]  Dept Fax: [phone]  Loc: [phone] Certification / Comments     Frequency/Duration 1-2 days per week for 8 weeks. Certification period from 7/18/2022  to 10/7/22. I have reviewed the Plan of Care established for skilled therapy services and certify that the services are required and that they will be provided while the patient is under my care.     Physician's Comments/Revisions:               Physician's Printed Name:                                           [de-identified] Signature:                                                               Date: no

## 2022-07-18 NOTE — PROGRESS NOTES
[facility]          Phone: [phone] Fax: [phone]    Physical Therapy Daily Treatment Note  Date:  2022    Patient Name:  Adiel Moraes    :  1960  MRN: [id number]    Evaluating therapist: Hernan Wallace PT, DPT  [id number]    Restrictions/Precautions:  ankle protocol week 6 starting 22. Diagnosis:     Diagnosis Orders   1. Closed fracture of left ankle with routine healing, subsequent encounter          Insurance/Certification information:  Medical [place]   Referring Physician: Rustam Hair PA-C  Plan of care signed (Y/N):    Visit# / total visits:    Pain level: 0/10   Time In:  1500  Time Out:  1540    Subjective:  See initial eval     Exercises:  Exercise/Equipment Resistance/Repetitions Other comments     Ankle ABC's  1x      BAPS board PF/DF 10 x  Level 2      BAPS board inv/ever 10x  Level 2     Long sitting gastroc stretch  30 secs x 2                                                                                                                   Other Therapeutic Activities:  Pt tolerated introduction of ROM w/o pain.      Home Exercise Program:  Ankle ABC's, Seated gastroc stretch     Manual Treatments:  n/a    Modalities:  n/a      Time-in Time-out Total Time   94194  Evaluation Low Complexity 1500 1525 25   09217  Evaluation Med Complexity      69441  Evaluation High Complexity      55874  Ther Ex 1525 1540 15   66228  Neuro Re-ed        83749  Ther Activities        05319  Manual Therapy       41924  E-stim       81649  Ultrasound            Session 1500 1540 40       Treatment/Activity Tolerance:  [x] Patient tolerated treatment well [] Patient limited by fatigue  [] Patient limited by pain  [] Patient limited by other medical complications  [] Other:     Prognosis: [x] Good [] Fair  [] Poor    Patient Requires Follow-up: [x] Yes  [] No    Plan:   [x] Continue per plan of care [] Alter current plan (see comments)  [] Plan of care initiated [] Hold pending MD visit [] Discharge  Plan for Next Session:  Continue to progress L ankle ROM and strength as tolerated.       Electronically signed by:    HENRRY Ortiz, PT, DPT  [id number]

## 2022-07-21 ENCOUNTER — TREATMENT (OUTPATIENT)
Dept: PHYSICAL THERAPY | Age: 62
End: 2022-07-21
Payer: COMMERCIAL

## 2022-07-21 DIAGNOSIS — S82.892D CLOSED FRACTURE OF LEFT ANKLE WITH ROUTINE HEALING, SUBSEQUENT ENCOUNTER: Primary | ICD-10-CM

## 2022-07-21 PROCEDURE — 97110 THERAPEUTIC EXERCISES: CPT | Performed by: PHYSICAL THERAPIST

## 2022-07-21 NOTE — PROGRESS NOTES
[facility]          Phone: [phone] Fax: [phone]    Physical Therapy Daily Treatment Note  Date:  2022    Patient Name:  Nikki Corrigan    :  1960  MRN: [id number]    Evaluating therapist: Kar Felix PT, DPT  [id number]    Restrictions/Precautions:  ankle protocol week 6 starting 22. Diagnosis:     Diagnosis Orders   1. Closed fracture of left ankle with routine healing, subsequent encounter          Insurance/Certification information:  Medical [place]   Referring Physician: Himanshu Klein PA-C  Plan of care signed (Y/N):  Y  Visit# / total visits:    Pain level: 0/10   Time In:  1500  Time Out:  1534    Subjective:  Pt reported no complaints of pain or discomfort during TE's. Exercises:  Exercise/Equipment Resistance/Repetitions Other comments     Ankle ABC's  1x      BAPS board PF/DF 10 x  Level 2      BAPS board inv/ever 10 x  Level 2     BAPS board circles  10 x  Level 2      Long sitting gastroc stretch  30 secs x 2       4 way ankle  10 x 2  OTB     Towel curls  5 rounds       Seated heel raise  10 x      Seated DF 10 x                                                                                      Other Therapeutic Activities:  Pt tolerated introduction of resisted 4 way ankle and towel curls with no reports of pain. Pt instructed to bring L shoe for next session.      Home Exercise Program:  Ankle ABC's, Seated gastroc stretch     Manual Treatments:  n/a    Modalities:  n/a      Time-in Time-out Total Time   73626  Evaluation Low Complexity      16729  Evaluation Med Complexity      28793  Evaluation High Complexity      18503  Ther Ex 1500 1534 34   35464  Neuro Re-ed        57608  Ther Activities        15308  Manual Therapy       66786  E-stim       87281  Ultrasound            Session 1500 1534 34       Treatment/Activity Tolerance:  [x] Patient tolerated treatment well [] Patient limited by fatigue  [] Patient limited by pain  [] Patient limited by other medical complications  [] Other:     Prognosis: [x] Good [] Fair  [] Poor    Patient Requires Follow-up: [x] Yes  [] No    Plan:   [x] Continue per plan of care [] Alter current plan (see comments)  [] Plan of care initiated [] Hold pending MD visit [] Discharge    Plan for Next Session:  Continue to progress L ankle ROM and strength as tolerated.       Electronically signed by:    Lizet Robin, HENRRY Sequeira, PT, DPT  [id number]

## 2022-07-26 ENCOUNTER — TREATMENT (OUTPATIENT)
Dept: PHYSICAL THERAPY | Age: 62
End: 2022-07-26
Payer: COMMERCIAL

## 2022-07-26 DIAGNOSIS — S82.892D CLOSED FRACTURE OF LEFT ANKLE WITH ROUTINE HEALING, SUBSEQUENT ENCOUNTER: Primary | ICD-10-CM

## 2022-07-26 PROCEDURE — 97110 THERAPEUTIC EXERCISES: CPT | Performed by: PHYSICAL THERAPIST

## 2022-07-26 NOTE — PROGRESS NOTES
[facility]          Phone: [phone] Fax: [phone]    Physical Therapy Daily Treatment Note  Date:  2022    Patient Name:  Cesilia Kraus    :  1960  MRN: [id number]    Evaluating therapist: Amairani Eller PT, DPT  [id number]    Restrictions/Precautions:  ankle protocol week 6 starting 22. Diagnosis:     Diagnosis Orders   1. Closed fracture of left ankle with routine healing, subsequent encounter          Insurance/Certification information:  Medical [place]   Referring Physician: Radames Rubio PA-C  Plan of care signed (Y/N):  Y  Visit# / total visits:  3/12  Pain level: 0/10   Time In:  1540  Time Out:  1630    Subjective:  Pt reported no complaints of pain prior to TE's and states she has been preforming her HEP. Pt reported no complaints of pain or discomfort during TE's. Exercises:  Exercise/Equipment Resistance/Repetitions Other comments     Ankle ABC's  1x      BAPS board PF/DF 10 x  Level 2      BAPS board inv/ever 10 x  Level 2     BAPS board circles  10 x  Level 2      Long sitting gastroc stretch  30 secs x 2       4 way ankle  10 x 2  OTB     Towel curls  5 rounds       Seated heel raise  10 x      Seated DF 10 x      Bike  10 mins 3.0 resistance                                                                               Other Therapeutic Activities: Pt tolerated TE with no complaints of pain or discomfort. Pt was able to tolerate riding the bike with her tennis shoe with no complaints of pain.         Home Exercise Program:  Ankle ABC's, Seated gastroc stretch     Manual Treatments:  n/a    Modalities:  n/a      Time-in Time-out Total Time   06913  Evaluation Low Complexity      86889  Evaluation Med Complexity      48310  Evaluation High Complexity      17276  Ther Ex 1540 1630 50   18962  Neuro Re-ed        54348  Ther Activities        06537  Manual Therapy       34558  E-stim       22008  Ultrasound            Session 8223 6243 50       Treatment/Activity Tolerance:  [x] Patient tolerated treatment well [] Patient limited by fatigue  [] Patient limited by pain  [] Patient limited by other medical complications  [] Other:     Prognosis: [x] Good [] Fair  [] Poor    Patient Requires Follow-up: [x] Yes  [] No    Plan:   [x] Continue per plan of care [] Alter current plan (see comments)  [] Plan of care initiated [] Hold pending MD visit [] Discharge    Plan for Next Session:  Continue to progress L ankle ROM and strength as tolerated.       Electronically signed by:    Lisha Hoffmann, HENRRY Caraballo, PT, DPT  [id number]

## 2022-08-03 ENCOUNTER — TREATMENT (OUTPATIENT)
Dept: PHYSICAL THERAPY | Age: 62
End: 2022-08-03
Payer: COMMERCIAL

## 2022-08-03 DIAGNOSIS — S82.892D CLOSED FRACTURE OF LEFT ANKLE WITH ROUTINE HEALING, SUBSEQUENT ENCOUNTER: Primary | ICD-10-CM

## 2022-08-03 PROCEDURE — 97110 THERAPEUTIC EXERCISES: CPT | Performed by: PHYSICAL THERAPIST

## 2022-08-03 NOTE — PROGRESS NOTES
[facility]          Phone: [phone] Fax: [phone]    Physical Therapy Daily Treatment Note  Date:  8/3/2022    Patient Name:  Lyndon Keller    :  1960  MRN: [id number]    Evaluating therapist: Rodrigo Sahu, PT, DPT  [id number]    Restrictions/Precautions:  ankle protocol week 6 starting 22. Diagnosis:     Diagnosis Orders   1. Closed fracture of left ankle with routine healing, subsequent encounter          Insurance/Certification information:  Medical [place]   Referring Physician: Renny Martinez PA-C  Plan of care signed (Y/N):  Y  Visit# / total visits:    Pain level: 1/10   Time In:  1500  Time Out:  1540    Subjective:  Pt reported of limited complaints of pain. Pt states her ankle is just sore from increased use since last visit. Pt L ankle presents with increased edema. Exercises:  Exercise/Equipment Resistance/Repetitions Other comments     4 way ankle  10 x 2  GTB        Bike  10 mins 3.0 resistance      Standing gastroc and soleus stretch  2 x 30sec hold        Standing BAPS board circles, PF/DF, Inv/ever 10 x ea Level 2      Heel raises standing  15 x        Bilateral crutches reciprocal gait 3 laps                                                 Other Therapeutic Activities:   Pt tolerated the introduction of TE's including reciprocal amb with bilateral crutches without CAM boot, standing heel raises, standing BAP board exercises, and standing gastroc/soleus stretch. Pt reports of no pain at the end of today's session. Home Exercise Program:  Ankle ABC's, Seated gastroc stretch, 3 way ankle. Pt was given a green therapy band to continue the progression of her 3 way ankle exercises.      Manual Treatments:  n/a    Modalities:  n/a      Time-in Time-out Total Time   36873  Evaluation Low Complexity      84162  Evaluation Med Complexity      11623  Evaluation High Complexity      98559  Ther Ex 1500 1540 40   08296  Neuro Re-ed

## 2022-08-09 ENCOUNTER — TREATMENT (OUTPATIENT)
Dept: PHYSICAL THERAPY | Age: 62
End: 2022-08-09
Payer: COMMERCIAL

## 2022-08-09 DIAGNOSIS — S82.892D CLOSED FRACTURE OF LEFT ANKLE WITH ROUTINE HEALING, SUBSEQUENT ENCOUNTER: Primary | ICD-10-CM

## 2022-08-09 PROCEDURE — 97110 THERAPEUTIC EXERCISES: CPT

## 2022-08-11 ENCOUNTER — TREATMENT (OUTPATIENT)
Dept: PHYSICAL THERAPY | Age: 62
End: 2022-08-11
Payer: COMMERCIAL

## 2022-08-11 DIAGNOSIS — S82.892D CLOSED FRACTURE OF LEFT ANKLE WITH ROUTINE HEALING, SUBSEQUENT ENCOUNTER: Primary | ICD-10-CM

## 2022-08-11 PROCEDURE — 97110 THERAPEUTIC EXERCISES: CPT | Performed by: PHYSICAL THERAPIST

## 2022-08-11 NOTE — PROGRESS NOTES
[facility]          Phone: [phone] Fax: [phone]    Physical Therapy Daily Treatment Note  Date:  2022    Patient Name:  Lyndon Keller    :  1960  MRN: [id number]    Evaluating therapist: Rodrigo Sahu PT, DPT  [id number]    Restrictions/Precautions:  ankle protocol week 6 starting 22. Diagnosis:     Diagnosis Orders   1. Closed fracture of left ankle with routine healing, subsequent encounter            Insurance/Certification information:  Medical [place]   Referring Physician: Renny Martinez PA-C  Plan of care signed (Y/N):  Y  Visit# / total visits:    Pain level: 0/10   Time In:  800  Time Out:  840    Subjective:  Pt reported of no pain upon arrival. Pt arrived in cam boot w/o crutches. Pt stated she has been wearing her cam boot everyday. Pt L ankle presents with increased edema. Exercises:  Exercise/Equipment Resistance/Repetitions Other comments     BAPS board PF/DF 10 x    BAPS board inv/ever 10 x    BAPS board circles  10 x       Bike  10 mins 3.0 resistance      Standing gastroc and soleus stretch  2 x 30sec hold  On incline     Heel raises standing  15 x       Bilateral reciprocal gait 2 laps  Without boot and crutches     SLS 2 x 30 sec hold       Step ups        Lateral step ups        3 way lunges  10 x  Disk under R foot                    Other Therapeutic Activities:   Pt tolerated TE's including reciprocal amb without crutches in tennis shoes. Pt educated she is allowed to wean from boot at home also per PA-C instructions. SLS with ball toss was to difficult for the Pt do to decreased stability, instead the exercise was regressed to SLS. Pt reported improved ankle stability and balance during SLS, 3 way lounges, and BAP board exercise. Pt reports of no pain at the end of today's session. Home Exercise Program:  Ankle ABC's, Seated gastroc stretch, 3 way ankle.  Pt was given a green therapy band to continue the progression of her 3 way ankle exercises. Manual Treatments:  n/a    Modalities: Kinesio tape applied to L ankle medially and laterally with overlapping fan pattern to promote edema management with tape anchored posterior to malleoli with 0% stretch applied and extending into foot dorsally. Time-in Time-out Total Time   83215  Evaluation Low Complexity      12857  Evaluation Med Complexity      60101  Evaluation High Complexity      92005  Ther Ex 0800 0840 40   18457  Neuro Re-ed        62478  Ther Activities        92819  Manual Therapy       15988  E-stim       62014  Ultrasound            Session 0800 0840 40       Treatment/Activity Tolerance:  [x] Patient tolerated treatment well [] Patient limited by fatigue  [] Patient limited by pain  [] Patient limited by other medical complications  [] Other:     Prognosis: [x] Good [] Fair  [] Poor    Patient Requires Follow-up: [x] Yes  [] No    Plan:   [x] Continue per plan of care [] Alter current plan (see comments)  [] Plan of care initiated [] Hold pending MD visit [] Discharge    Plan for Next Session:  Continue to progress L ankle ROM and strength as tolerated.       Electronically signed by:   Yoni Kelley, HENRRY Conte, PT, DPT  [id number]

## 2022-08-22 DIAGNOSIS — S82.892A CLOSED FRACTURE OF LEFT ANKLE, INITIAL ENCOUNTER: Primary | ICD-10-CM

## 2022-08-23 ENCOUNTER — TREATMENT (OUTPATIENT)
Dept: PHYSICAL THERAPY | Age: 62
End: 2022-08-23
Payer: COMMERCIAL

## 2022-08-23 DIAGNOSIS — S82.892D CLOSED FRACTURE OF LEFT ANKLE WITH ROUTINE HEALING, SUBSEQUENT ENCOUNTER: Primary | ICD-10-CM

## 2022-08-23 PROCEDURE — 97140 MANUAL THERAPY 1/> REGIONS: CPT | Performed by: PHYSICAL THERAPIST

## 2022-08-23 PROCEDURE — 97110 THERAPEUTIC EXERCISES: CPT | Performed by: PHYSICAL THERAPIST

## 2022-08-23 NOTE — PROGRESS NOTES
[facility]          Phone: [phone] Fax: [phone]    Physical Therapy Daily Treatment Note  Date:  2022    Patient Name:  Andrade Bryant    :  1960  MRN: [id number]    Evaluating therapist: Rogerio Leonard PT, DPT  [id number]    Restrictions/Precautions:  ankle protocol week 6 starting 22. Diagnosis:     Diagnosis Orders   1. Closed fracture of left ankle with routine healing, subsequent encounter            Insurance/Certification information:  Medical [place]   Referring Physician: Rodrigue Montoya PA-C  Plan of care signed (Y/N):  Y  Visit# / total visits:    Pain level: 0/10   Time In:  1500  Time Out:  1540    Subjective:  Pt reports increased swelling with heel and midfoot discomfort throughout the weekend but reports no c/o ankle pain. Exercises:  Exercise/Equipment Resistance/Repetitions Other comments     BAPS board PF/DF 25 x  Level 3      BAPS board inv/ever 25 x  Level 3     BAPS board circles  10 x  Level 3         Bike  10 mins 3.0 resistance      Standing gastroc and soleus stretch  2 x 60 sec hold  On incline     Heel raises standing  20x      SLS 2 x 30 sec hold       Step ups  20x to 6\" step      Lateral step ups  20x to 6\" step      3 way lunges  15x  Disk under R foot                    Other Therapeutic Activities:   Pt tolerated TE's this date without discomfort. She reports ongoing midfoot stiffness at base of MTP joints. Pt responded well to manual therapy with improved gait mechanics and decreased sensation of stiffness throughout L foot during toe off phase of gait. Pt demonstrated 50% reduction in forefoot and midfoot edema following manual therapy. Educated pt in self mobilization of toes and metatarsals for improved comfort. Home Exercise Program:  Ankle ABC's, Seated gastroc stretch, 3 way ankle. Pt was given a green therapy band to continue the progression of her 3 way ankle exercises.  Rolling plantar aspect of foot on frozen water bottle for arch and heel pain. Manual Treatments: Dorsal and plantar mobilization of metatarsals at forefoot, forefoot and midfoot arch splaying, effleurage of toes, midfoot, extending to mid calf. Mobilization of toes of L foot into flexion and extension. Manual stretching of ankle into PF/DF (x15 min)    Modalities: N/A     Time-in Time-out Total Time   54099  Evaluation Low Complexity      73651  Evaluation Med Complexity      88537  Evaluation High Complexity      84932  Ther Ex 1500 1525 25   47839  Neuro Re-ed        43264  Ther Activities        17172  Manual Therapy  4354 2850 15   15496  E-stim       83973  Ultrasound            Session 1500 1540 40       Treatment/Activity Tolerance:  [x] Patient tolerated treatment well [] Patient limited by fatigue  [] Patient limited by pain  [] Patient limited by other medical complications  [] Other:     Prognosis: [x] Good [] Fair  [] Poor    Patient Requires Follow-up: [x] Yes  [] No    Plan:   [x] Continue per plan of care [] Alter current plan (see comments)  [] Plan of care initiated [] Hold pending MD visit [] Discharge    Plan for Next Session:  Continue to progress L ankle ROM and strength as tolerated.       Electronically signed by:  Kar Felix, PT, DPT  [id number]

## 2022-08-24 ENCOUNTER — HOSPITAL ENCOUNTER (OUTPATIENT)
Dept: GENERAL RADIOLOGY | Age: 62
Discharge: HOME | End: 2022-08-26
Payer: COMMERCIAL

## 2022-08-24 ENCOUNTER — OFFICE VISIT (OUTPATIENT)
Dept: ORTHOPEDIC SURGERY | Age: 62
End: 2022-08-24
Payer: COMMERCIAL

## 2022-08-24 VITALS — HEIGHT: 70 IN | BODY MASS INDEX: 22.9 KG/M2 | WEIGHT: 160 LBS

## 2022-08-24 DIAGNOSIS — S82.892A CLOSED FRACTURE OF LEFT ANKLE, INITIAL ENCOUNTER: ICD-10-CM

## 2022-08-24 DIAGNOSIS — S82.892D CLOSED FRACTURE OF LEFT ANKLE WITH ROUTINE HEALING, SUBSEQUENT ENCOUNTER: Primary | ICD-10-CM

## 2022-08-24 PROCEDURE — 73610 X-RAY EXAM OF ANKLE: CPT

## 2022-08-24 PROCEDURE — 99212 OFFICE O/P EST SF 10 MIN: CPT | Performed by: PHYSICIAN ASSISTANT

## 2022-08-24 PROCEDURE — 99024 POSTOP FOLLOW-UP VISIT: CPT | Performed by: PHYSICIAN ASSISTANT

## 2022-08-24 NOTE — PROGRESS NOTES
in toes, foot warm/perfused. Ht 5' 10\" (1.778 m)   Wt 160 lb (72.6 kg)   BMI 22.96 kg/m²     XR:   3 views left ankle demonstrate ORIF left lateral malleolus fracture with the plate and screws in stable position and alignment. No evidence of hardware loosening or failure. Assessment:   Diagnosis Orders   1. Closed fracture of left ankle with routine healing, subsequent encounter          Plan:  X-rays reviewed and discussed. Continue weightbearing as tolerated left lower extremity. Continue PT. Neoprene ankle sleeve to help with swelling as needed. Follow-up as needed. Call if any questions or concerns. Electronically signed by SERGIO Mar on 8/24/2022 at 3:02 PM  Note: This report was completed using [employer] voiced recognition software. Every effort has been made to ensure accuracy; however, inadvertent computerized transcription errors may be present.

## 2022-08-25 ENCOUNTER — TREATMENT (OUTPATIENT)
Dept: PHYSICAL THERAPY | Age: 62
End: 2022-08-25
Payer: COMMERCIAL

## 2022-08-25 DIAGNOSIS — S82.892D CLOSED FRACTURE OF LEFT ANKLE WITH ROUTINE HEALING, SUBSEQUENT ENCOUNTER: Primary | ICD-10-CM

## 2022-08-25 PROCEDURE — 97110 THERAPEUTIC EXERCISES: CPT | Performed by: PHYSICAL THERAPIST

## 2022-08-25 PROCEDURE — 97140 MANUAL THERAPY 1/> REGIONS: CPT | Performed by: PHYSICAL THERAPIST

## 2022-08-25 NOTE — PROGRESS NOTES
[facility]          Phone: [phone] Fax: [phone]    Physical Therapy Daily Treatment Note  Date:  2022    Patient Name:  Yamilka Cerrato    :  1960  MRN: [id number]    Evaluating therapist: Syed Tucker PT, DPT  [id number]    Restrictions/Precautions:  ankle protocol week 6 starting 22. Diagnosis:     Diagnosis Orders   1. Closed fracture of left ankle with routine healing, subsequent encounter            Insurance/Certification information:  Medical [place]   Referring Physician: Roberth Beth PA-C  Plan of care signed (Y/N):  Y  Visit# / total visits:    Pain level: 0/10   Time In:  1500  Time Out:  1540    Subjective:  Pt reports improved edema since having assistance from family for at home massage of foot. Improved toe and forefoot mobility with amb since last session      Exercises:  Exercise/Equipment Resistance/Repetitions Other comments     BAPS board PF/DF 25 x  Level 4     BAPS board inv/ever 25 x  Level 4     BAPS board circles  10 x  Level 4         Standing gastroc and soleus stretch  2 x 60 sec hold  On incline     Heel raises standing  20x      SLS 2 x 30 sec hold       Step ups  20x to 6\" step      Lateral step ups  20x to 6\" step      3 way lunges  15x  Disk under R foot      Trampoline ball toss 15x Max of 3 before LOB     SLS tap forward/backward 20x To cones     Other Therapeutic Activities:   Pt attempted single leg heel raise but was unable to clear heel. Emphasis this date on single leg stance and dynamic balance for improved L ankle stability. Pt demonstrated improved ROM and forefoot mobility during manual therapy this date. Home Exercise Program:  Ankle ABC's, Seated gastroc stretch, 3 way ankle. Pt was given a green therapy band to continue the progression of her 3 way ankle exercises. Rolling plantar aspect of foot on frozen water bottle for arch and heel pain.     Manual Treatments: Dorsal and plantar mobilization of metatarsals at forefoot, forefoot and midfoot arch splaying, effleurage of toes, midfoot, extending to mid calf. Mobilization of toes of L foot into flexion and extension. Manual stretching of ankle into PF/DF. Mobilization of talocrural joint P>A grade 4 mobilization mobilization with movement into dorsiflexion. (x15 min)    Modalities: N/A     Time-in Time-out Total Time   09105  Evaluation Low Complexity      84234  Evaluation Med Complexity      66930  Evaluation High Complexity      59256  Ther Ex 1500 1525 25   05999  Neuro Re-ed        51962  Ther Activities        60175  Manual Therapy  8128 4800 15   50334  E-stim       63212  Ultrasound            Session 1500 1540 40       Treatment/Activity Tolerance:  [x] Patient tolerated treatment well [] Patient limited by fatigue  [] Patient limited by pain  [] Patient limited by other medical complications  [] Other:     Prognosis: [x] Good [] Fair  [] Poor    Patient Requires Follow-up: [x] Yes  [] No    Plan:   [x] Continue per plan of care [] Alter current plan (see comments)  [] Plan of care initiated [] Hold pending MD visit [] Discharge    Plan for Next Session:  Continue to progress L ankle ROM, stability and strength as tolerated.       Electronically signed Evangelina Addison, PT, DPT [id number]

## 2022-08-30 ENCOUNTER — TREATMENT (OUTPATIENT)
Dept: PHYSICAL THERAPY | Age: 62
End: 2022-08-30
Payer: COMMERCIAL

## 2022-08-30 DIAGNOSIS — S82.892D CLOSED FRACTURE OF LEFT ANKLE WITH ROUTINE HEALING, SUBSEQUENT ENCOUNTER: Primary | ICD-10-CM

## 2022-08-30 PROCEDURE — 97140 MANUAL THERAPY 1/> REGIONS: CPT | Performed by: PHYSICAL THERAPIST

## 2022-08-30 PROCEDURE — 97110 THERAPEUTIC EXERCISES: CPT | Performed by: PHYSICAL THERAPIST

## 2022-08-30 NOTE — PROGRESS NOTES
[facility]          Phone: [phone] Fax: [phone]    Physical Therapy Daily Treatment Note  Date:  2022    Patient Name:  Madalyn Stein    :  1960  MRN: [id number]    Evaluating therapist: Franky Lu PT, DPT  [id number]    Restrictions/Precautions:  ankle protocol week 6 starting 22. Diagnosis:     Diagnosis Orders   1. Closed fracture of left ankle with routine healing, subsequent encounter            Insurance/Certification information:  Medical [place]   Referring Physician: Terry Cesar PA-C  Plan of care signed (Y/N):  Y  Visit# / total visits:    Pain level: 0/10   Time In:  1500  Time Out:  1540    Subjective:  Pt reports she was able to tolerate amb on uneven terrain at the dog park without discomfort. She reported sensitivity to light touch on lateral aspect of ankle. Exercises:  Exercise/Equipment Resistance/Repetitions Other comments     Standing BAPS board PF/DF 20 x  Level 2    Standing BAPS board inv/ever 20 x  Level 2     Standing BAPS board circles  10 x  Level 2         Standing gastroc and soleus stretch  2 x 60 sec hold  On incline     Heel raises standing  20x      SLS 4x 15 sec hold       Step ups  20x to 6\" step      Lateral step ups  20x to 6\" step      3 way lunges  15x  Disk under R foot      Trampoline ball toss 15x Max of 3 before LOB     SLS tap forward/backward 20x    Treadmill 10 min [place] program, 3+mph     Other Therapeutic Activities:   Pt tolerated progression of stability exercises and reported feeling good after treadmill. Pt changing speed on treadmill to tolerance throughout 10 min [place] program setting. Pt was able to initiate standing BAPS board on L LE without discomfort. She was educated in desensitization techniques using dry towel on lateral aspect of L LE to decrease pain with light touch. Home Exercise Program:  Ankle ABC's, Seated gastroc stretch, 3 way ankle.  Pt was given a green therapy band to continue the progression of her 3 way ankle exercises. Rolling plantar aspect of foot on frozen water bottle for arch and heel pain. Manual Treatments: Mobilization of toes of L foot into flexion and extension. Manual stretching of ankle into PF/DF and inversion. Modalities: N/A     Time-in Time-out Total Time   78598  Evaluation Low Complexity      24934  Evaluation Med Complexity      70722  Evaluation High Complexity      55115  Ther Ex 1502 1530 28   06565  Neuro Re-ed        86096  Ther Activities        18658  Manual Therapy  7457 0822 10   62850  E-stim       65541  Ultrasound            Session 5464 3014 38       Treatment/Activity Tolerance:  [x] Patient tolerated treatment well [] Patient limited by fatigue  [] Patient limited by pain  [] Patient limited by other medical complications  [] Other:     Prognosis: [x] Good [] Fair  [] Poor    Patient Requires Follow-up: [x] Yes  [] No    Plan:   [x] Continue per plan of care [] Alter current plan (see comments)  [] Plan of care initiated [] Hold pending MD visit [] Discharge    Plan for Next Session:  Continue to progress L ankle ROM, stability and strength as tolerated.       Electronically signed by: Woodrow Guzman, PT, DPT [id number]

## 2022-09-01 ENCOUNTER — TREATMENT (OUTPATIENT)
Dept: PHYSICAL THERAPY | Age: 62
End: 2022-09-01
Payer: COMMERCIAL

## 2022-09-01 DIAGNOSIS — S82.892D CLOSED FRACTURE OF LEFT ANKLE WITH ROUTINE HEALING, SUBSEQUENT ENCOUNTER: Primary | ICD-10-CM

## 2022-09-01 PROCEDURE — 97110 THERAPEUTIC EXERCISES: CPT | Performed by: PHYSICAL THERAPIST

## 2022-09-01 NOTE — PROGRESS NOTES
[facility]          Phone: [phone] Fax: [phone]    Physical Therapy Daily Treatment Note  Date:  2022    Patient Name:  Syeda Cross    :  1960  MRN: [id number]    Evaluating therapist: Marisa Rodriguez PT, DPT  [id number]    Restrictions/Precautions:  ankle protocol week 6 starting 22. Diagnosis:     Diagnosis Orders   1. Closed fracture of left ankle with routine healing, subsequent encounter              Insurance/Certification information:  Medical [place]   Referring Physician: Shirley Biggs PA-C  Plan of care signed (Y/N):  Y  Visit# / total visits:    Pain level: 0/10   Time In:  1515  Time Out:  1538    Subjective:  Pt reports no c/o pain this date, was able to amb 2 miles with her dog and friend without discomfort. Exercises:  Exercise/Equipment Resistance/Repetitions Other comments     Standing BAPS board PF/DF 20 x  Level 3    Standing BAPS board inv/ever 20 x  Level 3     Standing BAPS board circles  10 x  Level 3        Standing gastroc and soleus stretch  2 x 60 sec hold  On incline     Single leg heel raises 15 x2      SLS 3x 15 sec hold       Step ups  20x to 6\" step      Lateral step ups  20x to 6\" step      3 way lunges  15x  Disk under R foot, standing on blue theraband foam     SLS tap forward/backward 20x Standing on blue theraband foam   Single leg hops 5 x3 Standing B LE hops 10x    Toe walking 2 laps around gym    Heel walking 2 laps around gym    Marching on trampoline 20x      Other Therapeutic Activities:   Pt reports intermittent sensation of instability of L LE. Pt completed single leg balance and marching on trampoline to simulate unstable surface without signs of instability of ankle. Pt demonstrates decreased hopping height d/t decreased L gastroc strength. She was able to improve heel lift height of R LE with single leg heel raises. Plan to trial jogging next session.     Home Exercise Program:  Ankle ABC's, Seated gastroc stretch, 3 way ankle. Pt was given a green therapy band to continue the progression of her 3 way ankle exercises. Rolling plantar aspect of foot on frozen water bottle for arch and heel pain. Manual Treatments: N/A    Modalities: N/A     Time-in Time-out Total Time   68365  Evaluation Low Complexity      37861  Evaluation Med Complexity      65354  Evaluation High Complexity      55751  Ther Ex 1515 1538 23   01143  Neuro Re-ed        78690  Ther Activities        81132  Manual Therapy       48356  E-stim       82669  Ultrasound            Session 7020 0323 52       Treatment/Activity Tolerance:  [x] Patient tolerated treatment well [] Patient limited by fatigue  [] Patient limited by pain  [] Patient limited by other medical complications  [] Other:     Prognosis: [x] Good [] Fair  [] Poor    Patient Requires Follow-up: [x] Yes  [] No    Plan:   [x] Continue per plan of care [] Alter current plan (see comments)  [] Plan of care initiated [] Hold pending MD visit [] Discharge    Plan for Next Session:  Continue to progress L ankle ROM, stability and strength as tolerated.       Electronically signed by: Malgorzata David, PT, DPT [id number]

## 2022-09-06 ENCOUNTER — TREATMENT (OUTPATIENT)
Dept: PHYSICAL THERAPY | Age: 62
End: 2022-09-06
Payer: COMMERCIAL

## 2022-09-06 DIAGNOSIS — S82.892D CLOSED FRACTURE OF LEFT ANKLE WITH ROUTINE HEALING, SUBSEQUENT ENCOUNTER: Primary | ICD-10-CM

## 2022-09-06 PROCEDURE — 97110 THERAPEUTIC EXERCISES: CPT | Performed by: PHYSICAL THERAPIST

## 2022-09-06 NOTE — PROGRESS NOTES
[facility]                Phone: [phone] Fax: [phone]    Physical Therapy  Outpatient Discharge Summary     Date:  2022    Patient Name:  Tabby Mondragon    :  1960  MRN: [id number]    DIAGNOSIS:     Diagnosis Orders   1. Closed fracture of left ankle with routine healing, subsequent encounter          REFERRING PHYSICIAN:  Mundo Loredo PA-C    ATTENDANCE:  Pt has attended 6 of 12 scheduled treatments from 2022 to 2022. TREATMENTS RECEIVED:  Manual therapy for improved forefoot mobility and edema management. Therapeutic exercises to improve L LE strength, stability, and ROM. Balance training for improved L ankle stability. INITIAL STATUS:  Pain reported 0/10  ROM decreased  Strength decreased   Decreased functional ability with walking, stairs, standing, use of left lower extremity, limited tolerance to weightbearing tasks and weightbearing duration, carrying, inability to participate in exercise regimen / fitness program   LEFS: 40/80    FINAL STATUS:  Pain reported 0/10  ROM improved:  PF: 60*, DF: 20*, inversion: 40*, Eversion 20*  Strength increased to 4/5 overall in L ankle   Pt is now able to ascend/descend >10 stairs with no HR with reciprocal pattern, amb 3 miles with her dog for exercise, and has began to intermittently jog as tolerated with interval training. LEFS: 77/80    GOALS:  6 out of 6 Long Term Goals were obtained. LONG TERM GOALS NOT OBTAINED/REASON:  N/A    PATIENT GOALS:  Improve ROM, get back to walking --Met. REASON FOR DISCHARGE:  all goals met    PATIENT EDUCATION/INSTRUCTIONS:  continue with strengthening exercises and gradual return to higher impact activities as tolerated    RECOMMENDATIONS:  follow up with ortho PRN        Thank you for the opportunity to work with your patient. If you have questions or comments, please feel free to contact me by phone or fax.       Electronically Signed by: Gary Max Erica Fill[place], [place]  [id number] 9/6/2022

## 2022-09-06 NOTE — PROGRESS NOTES
[facility]          Phone: [phone] Fax: [phone]    Physical Therapy Daily Treatment Note  Date:  2022    Patient Name:  Naina Hammer    :  1960  MRN: [id number]    Evaluating therapist: Odilia Zuleta PT, DPT  [id number]    Restrictions/Precautions:  ankle protocol week 6 starting 22. Diagnosis:     Diagnosis Orders   1. Closed fracture of left ankle with routine healing, subsequent encounter            Insurance/Certification information:  Medical [place]   Referring Physician: Lola Smith PA-C  Plan of care signed (Y/N):  Y  Visit# / total visits:  10/12  Pain level: 2/10   Time In:  1500  Time Out:  1538    Subjective:  Pt reports slight joint discomfort this date but is eager to attempt jogging. Exercises:  Exercise/Equipment Resistance/Repetitions Other comments     Standing BAPS board PF/DF 20 x  Level 3    Standing BAPS board inv/ever 20 x  Level 3     Standing BAPS board circles  10 x  Level 3        Standing gastroc and soleus stretch  2 x 60 sec hold  On incline     Single leg heel raises 15 x2      SLS 3x 15 sec hold       Step ups  20x to 6\" step      Lateral step ups  20x to 6\" step      3 way lunges  15x  Disk under R foot, standing on blue theraband foam     SLS tap forward/backward 20x Standing on blue theraband foam   Treadmill 10 min Intermittent jog/walk to tolerance            Marching on trampoline 20x      Other Therapeutic Activities:   Pt tolerated introduction of jogging on treadmill but controlled speed for interval training d/t L ankle fatigue. Pt demonstrates good understanding of all exercises and progression of return to previous activities as tolerated. She is able to complete stairs reciprocally without pain, returned to walking for exercise at 3 miles so far.     LEFS: 77/80  ROM: L ankle PF: 60*, DF: 20*, inversion: 40*, Eversion 20*  L ankle: 4/5 overall strength      Home Exercise Program:  Ankle ABC's, Seated gastroc stretch, 3 way ankle. Pt was given a green therapy band to continue the progression of her 3 way ankle exercises. Rolling plantar aspect of foot on frozen water bottle for arch and heel pain. Manual Treatments: N/A    Modalities: N/A     Time-in Time-out Total Time   83082  Evaluation Low Complexity      85433  Evaluation Med Complexity      20184  Evaluation High Complexity      68033  Ther Ex 1500 1538 38   82301  Neuro Re-ed        33039  Ther Activities        50910  Manual Therapy       98969  E-stim       66504  Ultrasound            Session 1500 1538 38       Treatment/Activity Tolerance:  [x] Patient tolerated treatment well [] Patient limited by fatigue  [] Patient limited by pain  [] Patient limited by other medical complications  [] Other:     Prognosis: [x] Good [] Fair  [] Poor    Patient Requires Follow-up: [x] Yes  [] No    Plan:   [x] Continue per plan of care [] Alter current plan (see comments)  [] Plan of care initiated [] Hold pending MD visit [] Discharge    Plan for Next Session:  Continue to progress L ankle ROM, stability and strength as tolerated.       Electronically signed by: Malgorzata David, PT, DPT [id number]

## 2023-01-21 ENCOUNTER — HOSPITAL ENCOUNTER (OUTPATIENT)
Dept: GENERAL RADIOLOGY | Age: 63
Discharge: HOME | End: 2023-01-21
Payer: COMMERCIAL

## 2023-01-21 DIAGNOSIS — Z12.31 VISIT FOR SCREENING MAMMOGRAM: ICD-10-CM

## 2023-01-21 PROCEDURE — 77063 BREAST TOMOSYNTHESIS BI: CPT

## 2023-02-08 ENCOUNTER — TELEPHONE (OUTPATIENT)
Dept: GENERAL RADIOLOGY | Age: 63
End: 2023-02-08

## 2023-02-08 ENCOUNTER — CLINICAL DOCUMENTATION (OUTPATIENT)
Dept: GENERAL RADIOLOGY | Age: 63
End: 2023-02-08

## 2023-02-08 NOTE — TELEPHONE ENCOUNTER
Call to patient in reference to her mammogram performed at [facility] on January 21, 2023. Instructed patient that the radiologist has recommended some additional breast imaging, in order to make a final determination/result. Informed her that a Rx is needed from her physician to proceed. Patient requests we get order from Dr Emelina Montez. Once we receive the script a  from [facility] will contact her to schedule the additional imaging study/studies. Verbalizes understanding and is agreeable to proceed. Patient also wanted images compared with priors at [facility]. Release faxed to [facility].
no

## 2023-02-08 NOTE — PROGRESS NOTES
Spoke with [address] [place] [address],[address]. They are having trouble making discs, it will be a few days before disc can be made and mailed to [facility].

## 2023-02-10 ENCOUNTER — CLINICAL DOCUMENTATION (OUTPATIENT)
Dept: GENERAL RADIOLOGY | Age: 63
End: 2023-02-10

## 2023-03-01 ENCOUNTER — CLINICAL DOCUMENTATION (OUTPATIENT)
Dept: GENERAL RADIOLOGY | Age: 63
End: 2023-03-01

## 2023-03-01 NOTE — PROGRESS NOTES
Spoke with Marcelina breast [facility]. They are unable to retrieve patient's past breast imaging to put on a disc.   (From 2019)

## 2023-03-02 ENCOUNTER — HOSPITAL ENCOUNTER (OUTPATIENT)
Dept: GENERAL RADIOLOGY | Age: 63
Discharge: HOME | End: 2023-03-04
Payer: COMMERCIAL

## 2023-03-02 DIAGNOSIS — R92.8 ABNORMAL MAMMOGRAM: ICD-10-CM

## 2023-03-02 PROCEDURE — 76642 ULTRASOUND BREAST LIMITED: CPT

## 2023-03-02 PROCEDURE — G0279 TOMOSYNTHESIS, MAMMO: HCPCS

## 2024-02-19 ENCOUNTER — HOSPITAL ENCOUNTER (OUTPATIENT)
Dept: GENERAL RADIOLOGY | Age: 64
Discharge: HOME | End: 2024-02-21
Payer: COMMERCIAL

## 2024-02-19 VITALS — HEIGHT: 70 IN | WEIGHT: 170 LBS | BODY MASS INDEX: 24.34 KG/M2

## 2024-02-19 DIAGNOSIS — Z12.31 VISIT FOR SCREENING MAMMOGRAM: ICD-10-CM

## 2024-02-19 PROCEDURE — 77067 SCR MAMMO BI INCL CAD: CPT

## 2024-03-11 ENCOUNTER — CLINICAL DOCUMENTATION (OUTPATIENT)
Dept: GENERAL RADIOLOGY | Age: 64
End: 2024-03-11

## 2024-03-11 NOTE — PROGRESS NOTES
Mammogram clinical report provided to patient. Report sent to Dr. Ronald Blandon as per patient's request.

## (undated) DEVICE — PADDING,UNDERCAST,COTTON, 4"X4YD STERILE: Brand: MEDLINE

## (undated) DEVICE — DRESSING,GAUZE,XEROFORM,CURAD,5"X9",ST: Brand: CURAD

## (undated) DEVICE — GLOVE SURG SZ 8 L12IN FNGR THK79MIL GRN LTX FREE

## (undated) DEVICE — GOWN,AURORA,BRTHSLV,2XL,18/CS: Brand: MEDLINE

## (undated) DEVICE — LOWER EXT KNEE DRAPE: Brand: MEDLINE INDUSTRIES, INC.

## (undated) DEVICE — BIT DRL L110MM DIA2.5MM G QUIK CPL W/O STP REUSE

## (undated) DEVICE — GAMMEX® NON-LATEX PI ORTHO SIZE 8, STERILE POLYISOPRENE POWDER-FREE SURGICAL GLOVE: Brand: GAMMEX

## (undated) DEVICE — GLOVE ORANGE PI 8   MSG9080

## (undated) DEVICE — BIT DRL L125MM DIA2.7MM QUIK CPL 3 FLUT

## (undated) DEVICE — BANDAGE COMPR W4INXL10YD WHITE/BEIGE E MTRX HK LOOP CLSR

## (undated) DEVICE — BIT DRL L125MM DIA2MM QUIK CPL W/O STP REUSE

## (undated) DEVICE — TUBING SUCT 12FR MAL ALUM SHFT FN CAP VENT UNIV CONN W/ OBT

## (undated) DEVICE — GLOVE ORTHO 8   MSG9480